# Patient Record
Sex: MALE | Race: OTHER | URBAN - METROPOLITAN AREA
[De-identification: names, ages, dates, MRNs, and addresses within clinical notes are randomized per-mention and may not be internally consistent; named-entity substitution may affect disease eponyms.]

---

## 2020-11-24 ENCOUNTER — OFFICE VISIT (OUTPATIENT)
Dept: FAMILY MEDICINE CLINIC | Facility: CLINIC | Age: 35
End: 2020-11-24
Payer: COMMERCIAL

## 2020-11-24 VITALS
BODY MASS INDEX: 26.71 KG/M2 | TEMPERATURE: 98 F | DIASTOLIC BLOOD PRESSURE: 80 MMHG | HEIGHT: 66.5 IN | SYSTOLIC BLOOD PRESSURE: 132 MMHG | RESPIRATION RATE: 18 BRPM | HEART RATE: 69 BPM | WEIGHT: 168.19 LBS | OXYGEN SATURATION: 100 %

## 2020-11-24 DIAGNOSIS — R73.9 HYPERGLYCEMIA: Primary | ICD-10-CM

## 2020-11-24 DIAGNOSIS — Z13.6 SCREENING, ISCHEMIC HEART DISEASE: ICD-10-CM

## 2020-11-24 DIAGNOSIS — Z83.3 FAMILY HISTORY OF DIABETES MELLITUS: ICD-10-CM

## 2020-11-24 DIAGNOSIS — Z13.0 SCREENING, ANEMIA, DEFICIENCY, IRON: ICD-10-CM

## 2020-11-24 PROCEDURE — 3008F BODY MASS INDEX DOCD: CPT | Performed by: FAMILY MEDICINE

## 2020-11-24 PROCEDURE — 82962 GLUCOSE BLOOD TEST: CPT | Performed by: FAMILY MEDICINE

## 2020-11-24 PROCEDURE — 3075F SYST BP GE 130 - 139MM HG: CPT | Performed by: FAMILY MEDICINE

## 2020-11-24 PROCEDURE — 81003 URINALYSIS AUTO W/O SCOPE: CPT | Performed by: FAMILY MEDICINE

## 2020-11-24 PROCEDURE — 3079F DIAST BP 80-89 MM HG: CPT | Performed by: FAMILY MEDICINE

## 2020-11-24 PROCEDURE — 99203 OFFICE O/P NEW LOW 30 MIN: CPT | Performed by: FAMILY MEDICINE

## 2020-11-24 RX ORDER — METFORMIN HYDROCHLORIDE 500 MG/1
500 TABLET, EXTENDED RELEASE ORAL
Qty: 90 TABLET | Refills: 0 | Status: SHIPPED | OUTPATIENT
Start: 2020-11-24 | End: 2020-12-10

## 2020-11-24 NOTE — PROGRESS NOTES
CHIEF COMPLAINT: Patient presents with:  Establish Care: Concerned with elevated fasting blood sugars     HPI:     Rafael Witt is a 30year old male presents for concern of diabetes.   Both his parents and grandparents have type 2 diabetes diagnose daily with breakfast. 90 tablet 0     Family History   Problem Relation Age of Onset   • Diabetes Father    • Diabetes Mother    • Asthma Mother    • No Known Problems Son    • No Known Problems Maternal Grandmother    • Diabetes Maternal Grandfather    • 11/24/2020 500*   • Bilirubin 11/24/2020 negative    • Ketones, UA 11/24/2020 negative    • Spec Gravity 11/24/2020 1.025    • Blood Urine 11/24/2020 trace-intact*   • PH Urine 11/24/2020 6.0    • Protein Urine 11/24/2020 negative    • Urobilinogen Urine 1 with breakfast.       Health Maintenance:  Annual Physical due on 05/25/1989  Annual Depression Screen due on 05/25/1998  Influenza Vaccine Completed  Pneumococcal Vaccine: Birth to 64yrs Completed      Patient/Caregiver Education: Patient/Caregiver Evangelina Ho

## 2020-11-24 NOTE — PATIENT INSTRUCTIONS
As of October 6th 2014, the Drug Enforcement Agency [facility]) is reclassifying all hydrocodone combination medications from Schedule III to Schedule II. This includes medications such as Norco, Vicodin, Lortab, Zohydro, and Vicoprofen.    What this means for yo

## 2020-12-06 NOTE — PROGRESS NOTES
Results reviewed. Released to [address] Will discuss with patient at next visit on 12/8/2020.

## 2020-12-10 ENCOUNTER — OFFICE VISIT (OUTPATIENT)
Dept: FAMILY MEDICINE CLINIC | Facility: CLINIC | Age: 35
End: 2020-12-10
Payer: COMMERCIAL

## 2020-12-10 VITALS
HEIGHT: 66 IN | BODY MASS INDEX: 26.97 KG/M2 | TEMPERATURE: 98 F | OXYGEN SATURATION: 98 % | HEART RATE: 81 BPM | DIASTOLIC BLOOD PRESSURE: 72 MMHG | RESPIRATION RATE: 18 BRPM | WEIGHT: 167.81 LBS | SYSTOLIC BLOOD PRESSURE: 114 MMHG

## 2020-12-10 DIAGNOSIS — Z00.00 ANNUAL PHYSICAL EXAM: Primary | ICD-10-CM

## 2020-12-10 DIAGNOSIS — E78.2 MIXED HYPERLIPIDEMIA: ICD-10-CM

## 2020-12-10 DIAGNOSIS — Z13.228 SCREENING FOR ENDOCRINE, NUTRITIONAL, METABOLIC AND IMMUNITY DISORDER: ICD-10-CM

## 2020-12-10 DIAGNOSIS — Z13.21 SCREENING FOR ENDOCRINE, NUTRITIONAL, METABOLIC AND IMMUNITY DISORDER: ICD-10-CM

## 2020-12-10 DIAGNOSIS — Z23 NEED FOR VACCINATION: ICD-10-CM

## 2020-12-10 DIAGNOSIS — Z13.0 SCREENING FOR ENDOCRINE, NUTRITIONAL, METABOLIC AND IMMUNITY DISORDER: ICD-10-CM

## 2020-12-10 DIAGNOSIS — R74.01 ELEVATED ALT MEASUREMENT: ICD-10-CM

## 2020-12-10 DIAGNOSIS — E11.9 NEW ONSET TYPE 2 DIABETES MELLITUS (HCC): ICD-10-CM

## 2020-12-10 DIAGNOSIS — Z13.29 SCREENING FOR ENDOCRINE, NUTRITIONAL, METABOLIC AND IMMUNITY DISORDER: ICD-10-CM

## 2020-12-10 PROBLEM — R73.9 HYPERGLYCEMIA: Status: RESOLVED | Noted: 2020-11-24 | Resolved: 2020-12-10

## 2020-12-10 PROCEDURE — 3078F DIAST BP <80 MM HG: CPT | Performed by: FAMILY MEDICINE

## 2020-12-10 PROCEDURE — 90471 IMMUNIZATION ADMIN: CPT | Performed by: FAMILY MEDICINE

## 2020-12-10 PROCEDURE — 3074F SYST BP LT 130 MM HG: CPT | Performed by: FAMILY MEDICINE

## 2020-12-10 PROCEDURE — 3008F BODY MASS INDEX DOCD: CPT | Performed by: FAMILY MEDICINE

## 2020-12-10 PROCEDURE — 99395 PREV VISIT EST AGE 18-39: CPT | Performed by: FAMILY MEDICINE

## 2020-12-10 PROCEDURE — 90715 TDAP VACCINE 7 YRS/> IM: CPT | Performed by: FAMILY MEDICINE

## 2020-12-10 RX ORDER — ATORVASTATIN CALCIUM 40 MG/1
40 TABLET, FILM COATED ORAL NIGHTLY
Qty: 90 TABLET | Refills: 0 | Status: SHIPPED | OUTPATIENT
Start: 2020-12-10 | End: 2021-03-04

## 2020-12-10 RX ORDER — METFORMIN HYDROCHLORIDE 500 MG/1
1000 TABLET, EXTENDED RELEASE ORAL
Qty: 90 TABLET | Refills: 0 | Status: SHIPPED | OUTPATIENT
Start: 2020-12-10 | End: 2021-01-22

## 2020-12-10 NOTE — PATIENT INSTRUCTIONS
As of October 6th 2014, the Drug Enforcement Agency [facility]) is reclassifying all hydrocodone combination medications from Schedule III to Schedule II. This includes medications such as Norco, Vicodin, Lortab, Zohydro, and Vicoprofen.    What this means for yo food and sugary drinks and sodas. Diet should include lean meats and vegetables including 5-7 servings of fruit and vegetables total in 1 day.   Never skip breakfast.  -Encouraged exercise 30 minutes or more 5 times weekly to prevent obesity and chronic di

## 2020-12-10 NOTE — PROGRESS NOTES
Patient presents with:  Physical: discuss recent labs       REASON FOR VISIT:    Wai Landaverde is a 30year old male who presents for an [address]. New onset diabetes-taking Metformin  mg daily-fasting blood sugars in the a. m No results found for: GLUCOSE  Lab Results   Component Value Date    CHOLEST 196 12/03/2020     Lab Results   Component Value Date    HDL 36 (L) 12/03/2020     No results found for: TRIGLY  Lab Results   Component Value Date     (H) 12/03/2020     L Cholesterol Screening Recommended screening varies with age, risk and gender LDL-CHOLESTEROL (mg/dL (calc))   Date Value   12/03/2020 128 (H)       Diabetes Screening  If history of high blood pressure or other  risk factors HEMOGLOBIN A1c (% of total Hgb) • metFORMIN HCl  MG Oral Tablet 24 Hr Take 1 tablet (500 mg total) by mouth daily with breakfast. 90 tablet 0      MEDICAL INFORMATION:   Past Medical History:   Diagnosis Date   • [facility]       History reviewed. No pertinent surgical history. LUNGS: clear to auscultation  CARDIO: RRR without murmur  GI: good BS's, no masses, HSM or tenderness  : Declined  RECTAL: Declined  MUSCULOSKELETAL: back is not tender, FROM of the back  EXTREMITIES: no cyanosis, clubbing or edema  Bilateral barefoot sk -Increase metFORMIN HCl  MG Oral Tablet 24 Hr; Take 2 tablets (1,000 mg total) by mouth daily with breakfast.  Dispense: 90 tablet;  Refill: 0  - COMP METABOLIC PANEL (14)  - HEMOGLOBIN A1C  Goal fasting blood sugar less than 130 and postprandial less • FLUZONE 6 months and older PFS 0.5 ml (24000) 11/20/2020       Influenza Annually   Pneumococcal if high risk   Td/Tdap once then every 10 years   HPV Males 11-21   Zoster (Shingles) 60 and older: one dose   Varicella 2 doses if not immune   MMR 1-2 dose

## 2020-12-11 ENCOUNTER — TELEPHONE (OUTPATIENT)
Dept: ENDOCRINOLOGY CLINIC | Facility: CLINIC | Age: 35
End: 2020-12-11

## 2020-12-11 NOTE — TELEPHONE ENCOUNTER
Diabetes Education: pt will call insurance first before scheduling and he will call back to sched first video visit which we had tentatively scheduled for Tues 12/22 @ 11:00. Pt does not want to schedule until he calls back.

## 2021-01-22 DIAGNOSIS — E11.9 NEW ONSET TYPE 2 DIABETES MELLITUS (HCC): ICD-10-CM

## 2021-01-22 RX ORDER — METFORMIN HYDROCHLORIDE 500 MG/1
1000 TABLET, EXTENDED RELEASE ORAL
Qty: 60 TABLET | Refills: 0 | Status: SHIPPED | OUTPATIENT
Start: 2021-01-22 | End: 2021-02-05

## 2021-01-22 NOTE — TELEPHONE ENCOUNTER
Pt requesting refill of   Requested Prescriptions     Pending Prescriptions Disp Refills   • metFORMIN HCl  MG Oral Tablet 24 Hr 90 tablet 0     Sig: Take 2 tablets (1,000 mg total) by mouth daily with breakfast.       Passed protocol.   Per LOV not

## 2021-01-26 DIAGNOSIS — E11.9 NEW ONSET TYPE 2 DIABETES MELLITUS (HCC): ICD-10-CM

## 2021-01-26 RX ORDER — METFORMIN HYDROCHLORIDE 500 MG/1
TABLET, EXTENDED RELEASE ORAL
Qty: 60 TABLET | Refills: 1 | OUTPATIENT
Start: 2021-01-26

## 2021-01-26 NOTE — TELEPHONE ENCOUNTER
Pt requesting refill of   Requested Prescriptions     Pending Prescriptions Disp Refills   • METFORMIN HCL  MG Oral Tablet 24 Hr [Pharmacy Med Name: METFORMIN HCL  MG TABLET] 60 tablet 1     Sig: TAKE 2 TABLETS BY MOUTH EVERY DAY WITH BREAKFA

## 2021-02-05 ENCOUNTER — OFFICE VISIT (OUTPATIENT)
Dept: FAMILY MEDICINE CLINIC | Facility: CLINIC | Age: 36
End: 2021-02-05
Payer: COMMERCIAL

## 2021-02-05 VITALS
TEMPERATURE: 97 F | WEIGHT: 165.81 LBS | SYSTOLIC BLOOD PRESSURE: 128 MMHG | BODY MASS INDEX: 26.65 KG/M2 | DIASTOLIC BLOOD PRESSURE: 70 MMHG | OXYGEN SATURATION: 100 % | RESPIRATION RATE: 18 BRPM | HEART RATE: 58 BPM | HEIGHT: 66 IN

## 2021-02-05 DIAGNOSIS — E78.2 MIXED HYPERLIPIDEMIA: ICD-10-CM

## 2021-02-05 DIAGNOSIS — Z23 NEED FOR VACCINATION: ICD-10-CM

## 2021-02-05 DIAGNOSIS — E11.9 TYPE 2 DIABETES MELLITUS WITHOUT COMPLICATION, WITHOUT LONG-TERM CURRENT USE OF INSULIN (HCC): Primary | ICD-10-CM

## 2021-02-05 PROCEDURE — 90632 HEPA VACCINE ADULT IM: CPT | Performed by: FAMILY MEDICINE

## 2021-02-05 PROCEDURE — 3008F BODY MASS INDEX DOCD: CPT | Performed by: FAMILY MEDICINE

## 2021-02-05 PROCEDURE — 99214 OFFICE O/P EST MOD 30 MIN: CPT | Performed by: FAMILY MEDICINE

## 2021-02-05 PROCEDURE — 3078F DIAST BP <80 MM HG: CPT | Performed by: FAMILY MEDICINE

## 2021-02-05 PROCEDURE — 3074F SYST BP LT 130 MM HG: CPT | Performed by: FAMILY MEDICINE

## 2021-02-05 PROCEDURE — 90472 IMMUNIZATION ADMIN EACH ADD: CPT | Performed by: FAMILY MEDICINE

## 2021-02-05 PROCEDURE — 90471 IMMUNIZATION ADMIN: CPT | Performed by: FAMILY MEDICINE

## 2021-02-05 PROCEDURE — 90746 HEPB VACCINE 3 DOSE ADULT IM: CPT | Performed by: FAMILY MEDICINE

## 2021-02-05 RX ORDER — BLOOD-GLUCOSE METER
1 EACH MISCELLANEOUS 2 TIMES DAILY
COMMUNITY
Start: 2020-12-10

## 2021-02-05 RX ORDER — BLOOD SUGAR DIAGNOSTIC
STRIP MISCELLANEOUS 2 TIMES DAILY
COMMUNITY
Start: 2020-12-10 | End: 2021-05-11

## 2021-02-05 RX ORDER — LANCETS 33 GAUGE
1 EACH MISCELLANEOUS 2 TIMES DAILY
COMMUNITY
Start: 2020-12-10 | End: 2022-01-04

## 2021-02-05 RX ORDER — METFORMIN HYDROCHLORIDE 500 MG/1
1000 TABLET, EXTENDED RELEASE ORAL 2 TIMES DAILY WITH MEALS
Qty: 360 TABLET | Refills: 0 | Status: SHIPPED | OUTPATIENT
Start: 2021-02-05 | End: 2021-05-04

## 2021-02-05 NOTE — PROGRESS NOTES
Patient presents with:  Diabetes    DM, HL, HTN f/u. HPI:   Kendall Tarango is a 30year old male who presents for recheck of his diabetes, HTN, lipids. Patient’s FBS have been 110-119. Post prandial 190. no hypoglycemic episodes.  Tolerating Metfor apply Misc 1 lancet by Finger stick route 2 (two) times daily. Past Medical History:   Diagnosis Date   • Diabetes ([facility]       History reviewed. No pertinent surgical history. Social History: Smoking:  Denies  Exercise: 6-7 times per week.   Diet: lancet by Finger stick route 2 (two) times daily.  - ONETOUCH ULTRA In Vitro Strip; 2 (two) times daily.  - Lancets (ONETOUCH DELICA PLUS [id number]) Does not apply Misc; 1 lancet by Finger stick route 2 (two) times daily.  - HEPATITIS B VACCINE, OVER 20  - Referrals:  None     2/5/2021  Chay Qureshi DO      Recommendations are: continue medications as advised, check HgbA1C, check fasting lipids and CMP in 3 months, diabetic diet and exercise 7 times per week -30 minutes walking daily, check feet daily nina

## 2021-02-14 DIAGNOSIS — E11.9 TYPE 2 DIABETES MELLITUS WITHOUT COMPLICATION, WITHOUT LONG-TERM CURRENT USE OF INSULIN (HCC): ICD-10-CM

## 2021-02-15 RX ORDER — METFORMIN HYDROCHLORIDE 500 MG/1
TABLET, EXTENDED RELEASE ORAL
Qty: 90 TABLET | Refills: 0 | OUTPATIENT
Start: 2021-02-15

## 2021-02-15 NOTE — TELEPHONE ENCOUNTER
Pt requesting refill of   Requested Prescriptions     Pending Prescriptions Disp Refills   • METFORMIN HCL  MG Oral Tablet 24 Hr [Pharmacy Med Name: METFORMIN HCL  MG TABLET] 90 tablet 0     Sig: TAKE 1 TABLET BY MOUTH EVERY DAY WITH BREAKFAS

## 2021-02-17 DIAGNOSIS — E11.9 TYPE 2 DIABETES MELLITUS WITHOUT COMPLICATION, WITHOUT LONG-TERM CURRENT USE OF INSULIN (HCC): ICD-10-CM

## 2021-02-18 RX ORDER — METFORMIN HYDROCHLORIDE 500 MG/1
TABLET, EXTENDED RELEASE ORAL
Qty: 60 TABLET | Refills: 0 | OUTPATIENT
Start: 2021-02-18

## 2021-02-18 NOTE — TELEPHONE ENCOUNTER
Requested Prescriptions     Pending Prescriptions Disp Refills   • METFORMIN HCL  MG Oral Tablet 24 Hr [Pharmacy Med Name: METFORMIN HCL  MG TABLET] 60 tablet 0     Sig: TAKE 2 TABLETS BY MOUTH EVERY DAY WITH BREAKFAST     Duplicate request. Ne

## 2021-03-04 DIAGNOSIS — E78.2 MIXED HYPERLIPIDEMIA: ICD-10-CM

## 2021-03-04 RX ORDER — ATORVASTATIN CALCIUM 40 MG/1
TABLET, FILM COATED ORAL
Qty: 90 TABLET | Refills: 0 | Status: SHIPPED | OUTPATIENT
Start: 2021-03-04 | End: 2021-05-06

## 2021-03-04 NOTE — TELEPHONE ENCOUNTER
Pt requesting refill of   Requested Prescriptions     Pending Prescriptions Disp Refills   • ATORVASTATIN 40 MG Oral Tab [Pharmacy Med Name: ATORVASTATIN 40 MG TABLET] 90 tablet 0     Sig: TAKE 1 TABLET BY MOUTH EVERY DAY AT NIGHT     Passed protocol, re

## 2021-05-01 DIAGNOSIS — E11.9 TYPE 2 DIABETES MELLITUS WITHOUT COMPLICATION, WITHOUT LONG-TERM CURRENT USE OF INSULIN (HCC): ICD-10-CM

## 2021-05-03 NOTE — TELEPHONE ENCOUNTER
Pt requesting refill of   Requested Prescriptions     Pending Prescriptions Disp Refills   • METFORMIN HCL  MG Oral Tablet 24 Hr [Pharmacy Med Name: METFORMIN HCL  MG TABLET] 360 tablet 0     Sig: TAKE 2 TABLETS BY MOUTH TWICE A DAY WITH MEALS

## 2021-05-04 RX ORDER — METFORMIN HYDROCHLORIDE 500 MG/1
TABLET, EXTENDED RELEASE ORAL
Qty: 360 TABLET | Refills: 0 | Status: SHIPPED | OUTPATIENT
Start: 2021-05-04 | End: 2021-05-06

## 2021-05-06 ENCOUNTER — OFFICE VISIT (OUTPATIENT)
Dept: FAMILY MEDICINE CLINIC | Facility: CLINIC | Age: 36
End: 2021-05-06
Payer: COMMERCIAL

## 2021-05-06 VITALS
WEIGHT: 166.81 LBS | SYSTOLIC BLOOD PRESSURE: 120 MMHG | HEART RATE: 70 BPM | RESPIRATION RATE: 18 BRPM | OXYGEN SATURATION: 100 % | BODY MASS INDEX: 26.81 KG/M2 | TEMPERATURE: 98 F | HEIGHT: 66 IN | DIASTOLIC BLOOD PRESSURE: 80 MMHG

## 2021-05-06 DIAGNOSIS — E78.2 MIXED HYPERLIPIDEMIA: ICD-10-CM

## 2021-05-06 DIAGNOSIS — Z23 NEED FOR VACCINATION: ICD-10-CM

## 2021-05-06 DIAGNOSIS — E11.9 TYPE 2 DIABETES MELLITUS WITHOUT COMPLICATION, WITHOUT LONG-TERM CURRENT USE OF INSULIN (HCC): Primary | ICD-10-CM

## 2021-05-06 PROCEDURE — 3008F BODY MASS INDEX DOCD: CPT | Performed by: FAMILY MEDICINE

## 2021-05-06 PROCEDURE — 90472 IMMUNIZATION ADMIN EACH ADD: CPT | Performed by: FAMILY MEDICINE

## 2021-05-06 PROCEDURE — 90732 PPSV23 VACC 2 YRS+ SUBQ/IM: CPT | Performed by: FAMILY MEDICINE

## 2021-05-06 PROCEDURE — 99214 OFFICE O/P EST MOD 30 MIN: CPT | Performed by: FAMILY MEDICINE

## 2021-05-06 PROCEDURE — 90471 IMMUNIZATION ADMIN: CPT | Performed by: FAMILY MEDICINE

## 2021-05-06 PROCEDURE — 3074F SYST BP LT 130 MM HG: CPT | Performed by: FAMILY MEDICINE

## 2021-05-06 PROCEDURE — 90746 HEPB VACCINE 3 DOSE ADULT IM: CPT | Performed by: FAMILY MEDICINE

## 2021-05-06 PROCEDURE — 3079F DIAST BP 80-89 MM HG: CPT | Performed by: FAMILY MEDICINE

## 2021-05-06 RX ORDER — ATORVASTATIN CALCIUM 20 MG/1
20 TABLET, FILM COATED ORAL NIGHTLY
Qty: 90 TABLET | Refills: 0 | Status: SHIPPED | OUTPATIENT
Start: 2021-05-06 | End: 2021-05-11

## 2021-05-06 RX ORDER — METFORMIN HYDROCHLORIDE 500 MG/1
1000 TABLET, EXTENDED RELEASE ORAL 2 TIMES DAILY WITH MEALS
Qty: 360 TABLET | Refills: 0 | Status: SHIPPED | OUTPATIENT
Start: 2021-05-06 | End: 2021-05-11

## 2021-05-06 NOTE — PROGRESS NOTES
Patient presents with:  Diabetes    DM, HL, HTN f/u. HPI:   Bear Kaye is a 30year old male who presents for recheck of his diabetes, HTN, lipids. Patient’s FBS have been 112-119. Post prandial 190. no hypoglycemic episodes.  Tolerating Metfor w/Device Does not apply Kit 1 lancet by Finger stick route 2 (two) times daily. (Patient not taking: Reported on 5/6/2021 )     • ONETOUCH ULTRA In Vitro Strip 2 (two) times daily.  (Patient not taking: Reported on 5/6/2021 )     • Lancets [facility] ([facility]  - HEPATITIS B VACCINE, OVER 20  - metFORMIN HCl  MG Oral Tablet 24 Hr; Take 2 tablets (1,000 mg total) by mouth 2 (two) times daily with meals. Dispense: 360 tablet;  Refill: 0  - PNEUMOCOCCAL IMM (PNEUMOVAX)  - COMP METABOLIC PANEL (14)  - LI daily with meals. • empagliflozin (JARDIANCE) 10 MG Oral Tab 90 tablet 0     Sig: Take 1 tablet (10 mg total) by mouth daily.        Health Maintenance:  Diabetes Care Dilated Eye Exam due on 05/25/1986  Pneumococcal Vaccine: Birth to 64yrs(1 of 1 - PPSV2

## 2021-05-10 ENCOUNTER — OFFICE VISIT (OUTPATIENT)
Dept: OTHER | Facility: HOSPITAL | Age: 36
End: 2021-05-10
Attending: PREVENTIVE MEDICINE
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
## 2021-05-10 PROCEDURE — 86803 HEPATITIS C AB TEST: CPT

## 2021-05-10 PROCEDURE — 87340 HEPATITIS B SURFACE AG IA: CPT

## 2021-05-10 PROCEDURE — 86701 HIV-1ANTIBODY: CPT

## 2021-05-11 ENCOUNTER — TELEPHONE (OUTPATIENT)
Dept: FAMILY MEDICINE CLINIC | Facility: CLINIC | Age: 36
End: 2021-05-11

## 2021-05-11 DIAGNOSIS — E78.2 MIXED HYPERLIPIDEMIA: ICD-10-CM

## 2021-05-11 DIAGNOSIS — E11.9 TYPE 2 DIABETES MELLITUS WITHOUT COMPLICATION, WITHOUT LONG-TERM CURRENT USE OF INSULIN (HCC): ICD-10-CM

## 2021-05-11 RX ORDER — BLOOD SUGAR DIAGNOSTIC
1 STRIP MISCELLANEOUS 2 TIMES DAILY
Qty: 200 EACH | Refills: 3 | Status: SHIPPED | OUTPATIENT
Start: 2021-05-11 | End: 2021-08-30

## 2021-05-11 RX ORDER — METFORMIN HYDROCHLORIDE 500 MG/1
1000 TABLET, EXTENDED RELEASE ORAL 2 TIMES DAILY WITH MEALS
Qty: 360 TABLET | Refills: 0 | Status: SHIPPED | OUTPATIENT
Start: 2021-05-11 | End: 2021-09-10

## 2021-05-11 RX ORDER — ATORVASTATIN CALCIUM 20 MG/1
20 TABLET, FILM COATED ORAL NIGHTLY
Qty: 90 TABLET | Refills: 0 | Status: SHIPPED | OUTPATIENT
Start: 2021-05-11 | End: 2021-09-10

## 2021-05-11 NOTE — TELEPHONE ENCOUNTER
Pt calling in states he needs his medications from 5/6/21 sent to [facility] instead of [facility]  Atorvastatin, metformin, jardiance, testing strips     Medications sent to [facility]

## 2021-05-29 DIAGNOSIS — E78.2 MIXED HYPERLIPIDEMIA: ICD-10-CM

## 2021-06-01 RX ORDER — ATORVASTATIN CALCIUM 40 MG/1
TABLET, FILM COATED ORAL
Qty: 90 TABLET | Refills: 0 | OUTPATIENT
Start: 2021-06-01

## 2021-06-01 NOTE — TELEPHONE ENCOUNTER
Requested Prescriptions     Pending Prescriptions Disp Refills   • ATORVASTATIN 40 MG Oral Tab [Pharmacy Med Name: ATORVASTATIN 40 MG TABLET] 90 tablet 0     Sig: TAKE 1 TABLET BY MOUTH EVERY DAY AT NIGHT     Atorvastatin 40mg dose denied.    Dose changed t

## 2021-06-05 DIAGNOSIS — E11.9 TYPE 2 DIABETES MELLITUS WITHOUT COMPLICATION, WITHOUT LONG-TERM CURRENT USE OF INSULIN (HCC): ICD-10-CM

## 2021-06-07 RX ORDER — METFORMIN HYDROCHLORIDE 500 MG/1
TABLET, EXTENDED RELEASE ORAL
Qty: 60 TABLET | Refills: 0 | OUTPATIENT
Start: 2021-06-07

## 2021-06-07 NOTE — TELEPHONE ENCOUNTER
Requested Prescriptions     Pending Prescriptions Disp Refills   • METFORMIN HCL  MG Oral Tablet 24 Hr [Pharmacy Med Name: METFORMIN HCL  MG TABLET] 60 tablet 0     Sig: TAKE 2 TABLETS BY MOUTH EVERY DAY WITH BREAKFAST     Refill for metformin

## 2021-07-17 DIAGNOSIS — E11.9 TYPE 2 DIABETES MELLITUS WITHOUT COMPLICATION, WITHOUT LONG-TERM CURRENT USE OF INSULIN (HCC): ICD-10-CM

## 2021-07-17 DIAGNOSIS — E78.2 MIXED HYPERLIPIDEMIA: ICD-10-CM

## 2021-07-19 RX ORDER — BLOOD SUGAR DIAGNOSTIC
1 STRIP MISCELLANEOUS 2 TIMES DAILY
Qty: 200 EACH | Refills: 3 | OUTPATIENT
Start: 2021-07-19

## 2021-07-19 RX ORDER — ATORVASTATIN CALCIUM 20 MG/1
20 TABLET, FILM COATED ORAL NIGHTLY
Qty: 90 TABLET | Refills: 0 | OUTPATIENT
Start: 2021-07-19

## 2021-07-19 NOTE — TELEPHONE ENCOUNTER
Pt requesting refill of   Requested Prescriptions     Pending Prescriptions Disp Refills   • atorvastatin 20 MG Oral Tab 90 tablet 0     Sig: Take 1 tablet (20 mg total) by mouth nightly.    • ONETOUCH ULTRA In Vitro Strip 200 each 3     Si each by [employer]

## 2021-08-07 PROCEDURE — 3051F HG A1C>EQUAL 7.0%<8.0%: CPT | Performed by: FAMILY MEDICINE

## 2021-08-08 LAB
ALBUMIN/GLOBULIN RATIO: 2 (CALC) (ref 1–2.5)
ALBUMIN: 4.7 G/DL (ref 3.6–5.1)
ALKALINE PHOSPHATASE: 77 U/L (ref 36–130)
ALT: 70 U/L (ref 9–46)
AST: 28 U/L (ref 10–40)
BILIRUBIN, TOTAL: 0.9 MG/DL (ref 0.2–1.2)
BUN: 16 MG/DL (ref 7–25)
CALCIUM: 9.7 MG/DL (ref 8.6–10.3)
CARBON DIOXIDE: 29 MMOL/L (ref 20–32)
CHLORIDE: 102 MMOL/L (ref 98–110)
CHOL/HDLC RATIO: 2.9 (CALC)
CHOLESTEROL, TOTAL: 106 MG/DL
CREATININE: 0.97 MG/DL (ref 0.6–1.35)
EGFR IF AFRICN AM: 117 ML/MIN/1.73M2
EGFR IF NONAFRICN AM: 101 ML/MIN/1.73M2
GLOBULIN: 2.4 G/DL (CALC) (ref 1.9–3.7)
GLUCOSE: 142 MG/DL (ref 65–99)
HDL CHOLESTEROL: 37 MG/DL
HEMOGLOBIN A1C: 7.9 % OF TOTAL HGB
LDL-CHOLESTEROL: 47 MG/DL (CALC)
NON-HDL CHOLESTEROL: 69 MG/DL (CALC)
POTASSIUM: 5 MMOL/L (ref 3.5–5.3)
PROTEIN, TOTAL: 7.1 G/DL (ref 6.1–8.1)
SODIUM: 139 MMOL/L (ref 135–146)
TRIGLYCERIDES: 134 MG/DL

## 2021-08-30 DIAGNOSIS — E78.2 MIXED HYPERLIPIDEMIA: ICD-10-CM

## 2021-08-30 DIAGNOSIS — E11.9 TYPE 2 DIABETES MELLITUS WITHOUT COMPLICATION, WITHOUT LONG-TERM CURRENT USE OF INSULIN (HCC): ICD-10-CM

## 2021-08-30 RX ORDER — METFORMIN HYDROCHLORIDE 500 MG/1
1000 TABLET, EXTENDED RELEASE ORAL 2 TIMES DAILY WITH MEALS
Qty: 360 TABLET | Refills: 0 | OUTPATIENT
Start: 2021-08-30

## 2021-08-30 RX ORDER — BLOOD SUGAR DIAGNOSTIC
1 STRIP MISCELLANEOUS 2 TIMES DAILY
Qty: 200 EACH | Refills: 3 | Status: SHIPPED | OUTPATIENT
Start: 2021-08-30 | End: 2022-01-04

## 2021-08-30 NOTE — TELEPHONE ENCOUNTER
Pt requesting refill of   Requested Prescriptions     Pending Prescriptions Disp Refills   • ONETOUCH ULTRA In Vitro Strip 200 each 3     Si each by Other route 2 (two) times daily.      Refused Prescriptions Disp Refills   • metFORMIN HCl  MG O

## 2021-09-10 RX ORDER — METFORMIN HYDROCHLORIDE 500 MG/1
1000 TABLET, EXTENDED RELEASE ORAL 2 TIMES DAILY WITH MEALS
Qty: 360 TABLET | Refills: 0 | Status: SHIPPED | OUTPATIENT
Start: 2021-09-10 | End: 2021-09-13

## 2021-09-10 RX ORDER — ATORVASTATIN CALCIUM 20 MG/1
20 TABLET, FILM COATED ORAL NIGHTLY
Qty: 90 TABLET | Refills: 0 | Status: SHIPPED | OUTPATIENT
Start: 2021-09-10 | End: 2021-09-13

## 2021-09-10 NOTE — TELEPHONE ENCOUNTER
Pt requesting refill of   Requested Prescriptions     Pending Prescriptions Disp Refills   • metFORMIN HCl  MG Oral Tablet 24 Hr 360 tablet 0     Sig: Take 2 tablets (1,000 mg total) by mouth 2 (two) times daily with meals.    • atorvastatin 20 MG O

## 2021-09-13 ENCOUNTER — OFFICE VISIT (OUTPATIENT)
Dept: FAMILY MEDICINE CLINIC | Facility: CLINIC | Age: 36
End: 2021-09-13
Payer: COMMERCIAL

## 2021-09-13 VITALS
HEART RATE: 67 BPM | SYSTOLIC BLOOD PRESSURE: 108 MMHG | WEIGHT: 165.38 LBS | HEIGHT: 66 IN | BODY MASS INDEX: 26.58 KG/M2 | DIASTOLIC BLOOD PRESSURE: 64 MMHG | OXYGEN SATURATION: 99 % | TEMPERATURE: 99 F | RESPIRATION RATE: 16 BRPM

## 2021-09-13 DIAGNOSIS — E78.2 MIXED HYPERLIPIDEMIA: ICD-10-CM

## 2021-09-13 DIAGNOSIS — E11.9 TYPE 2 DIABETES MELLITUS WITHOUT COMPLICATION, WITHOUT LONG-TERM CURRENT USE OF INSULIN (HCC): Primary | ICD-10-CM

## 2021-09-13 PROCEDURE — 99214 OFFICE O/P EST MOD 30 MIN: CPT | Performed by: FAMILY MEDICINE

## 2021-09-13 PROCEDURE — 3078F DIAST BP <80 MM HG: CPT | Performed by: FAMILY MEDICINE

## 2021-09-13 PROCEDURE — 3074F SYST BP LT 130 MM HG: CPT | Performed by: FAMILY MEDICINE

## 2021-09-13 PROCEDURE — 3008F BODY MASS INDEX DOCD: CPT | Performed by: FAMILY MEDICINE

## 2021-09-13 RX ORDER — METFORMIN HYDROCHLORIDE 500 MG/1
1000 TABLET, EXTENDED RELEASE ORAL 2 TIMES DAILY WITH MEALS
Qty: 360 TABLET | Refills: 0 | Status: SHIPPED | OUTPATIENT
Start: 2021-09-13 | End: 2022-01-04

## 2021-09-13 RX ORDER — ATORVASTATIN CALCIUM 20 MG/1
20 TABLET, FILM COATED ORAL NIGHTLY
Qty: 90 TABLET | Refills: 0 | Status: SHIPPED | OUTPATIENT
Start: 2021-09-13 | End: 2022-01-04

## 2021-09-13 NOTE — PROGRESS NOTES
Patient presents with: Follow - Up: 3 months DM     DM, HL, HTN f/u. HPI:   Maine Gavin is a 29year old male who presents for recheck of his diabetes, HTN, lipids. Patient’s FBS have been 88-97 x 1   month. Post prandial 140 x 1 month.  Previo 04/24/2021 39   12/03/2020 128 (H)     AST (U/L)   Date Value   08/07/2021 28   04/24/2021 25   12/03/2020 23     ALT (U/L)   Date Value   08/07/2021 70 (H)   04/24/2021 64 (H)   12/03/2020 55 (H)        Current Outpatient Medications   Medication Vinicio Houston soft, NT/ND no HSM and NABS. Extremities: symmetric no abnormalities and normal strength. No cyanosis, clubbing or edema. Vascular: TP and DP 2+ zane. Skin: is unremarkable with no rashes.     Neuro: no focal abnormalities, and reflexes coordination an sample  Epvmqpkydbzd-ezlzpdurowhj-fmnowhpld A1c  Patient's fasting blood sugars and 2-hour postprandials controlled in the past month  Repeat labs in 3 months.   If A1c is not controlled add Farxiga-peers in network  Goal of fasting blood sugar is  an treatments as a result of today.        Imaging & Referrals:  None     2/5/2021  Salvador Haile DO      Recommendations are: continue medications as advised, check HgbA1C, check fasting lipids and CMP in 3 months, diabetic diet and exercise 7 times per week

## 2022-01-04 ENCOUNTER — OFFICE VISIT (OUTPATIENT)
Dept: FAMILY MEDICINE CLINIC | Facility: CLINIC | Age: 37
End: 2022-01-04
Payer: COMMERCIAL

## 2022-01-04 VITALS
RESPIRATION RATE: 16 BRPM | OXYGEN SATURATION: 99 % | HEIGHT: 66 IN | WEIGHT: 160 LBS | BODY MASS INDEX: 25.71 KG/M2 | SYSTOLIC BLOOD PRESSURE: 118 MMHG | TEMPERATURE: 97 F | DIASTOLIC BLOOD PRESSURE: 72 MMHG | HEART RATE: 68 BPM

## 2022-01-04 DIAGNOSIS — R74.01 ELEVATED ALT MEASUREMENT: ICD-10-CM

## 2022-01-04 DIAGNOSIS — E78.2 MIXED HYPERLIPIDEMIA: ICD-10-CM

## 2022-01-04 DIAGNOSIS — Z23 NEED FOR VACCINATION: ICD-10-CM

## 2022-01-04 DIAGNOSIS — M76.891 TENDONITIS OF KNEE, RIGHT: ICD-10-CM

## 2022-01-04 DIAGNOSIS — D18.01 HEMANGIOMA OF SKIN: ICD-10-CM

## 2022-01-04 DIAGNOSIS — E11.9 TYPE 2 DIABETES MELLITUS WITHOUT COMPLICATION, WITHOUT LONG-TERM CURRENT USE OF INSULIN (HCC): Primary | ICD-10-CM

## 2022-01-04 LAB
CREAT UR-SCNC: 136 MG/DL
MICROALBUMIN UR-MCNC: 0.87 MG/DL
MICROALBUMIN/CREAT 24H UR-RTO: 6.4 UG/MG (ref ?–30)

## 2022-01-04 PROCEDURE — 99214 OFFICE O/P EST MOD 30 MIN: CPT | Performed by: FAMILY MEDICINE

## 2022-01-04 PROCEDURE — 3078F DIAST BP <80 MM HG: CPT | Performed by: FAMILY MEDICINE

## 2022-01-04 PROCEDURE — 3074F SYST BP LT 130 MM HG: CPT | Performed by: FAMILY MEDICINE

## 2022-01-04 PROCEDURE — 3008F BODY MASS INDEX DOCD: CPT | Performed by: FAMILY MEDICINE

## 2022-01-04 PROCEDURE — 90471 IMMUNIZATION ADMIN: CPT | Performed by: FAMILY MEDICINE

## 2022-01-04 PROCEDURE — 82043 UR ALBUMIN QUANTITATIVE: CPT | Performed by: FAMILY MEDICINE

## 2022-01-04 PROCEDURE — 90746 HEPB VACCINE 3 DOSE ADULT IM: CPT | Performed by: FAMILY MEDICINE

## 2022-01-04 PROCEDURE — 82570 ASSAY OF URINE CREATININE: CPT | Performed by: FAMILY MEDICINE

## 2022-01-04 RX ORDER — BLOOD-GLUCOSE METER
1 EACH MISCELLANEOUS 2 TIMES DAILY
Qty: 1 KIT | Refills: 0 | Status: SHIPPED | OUTPATIENT
Start: 2022-01-04 | End: 2023-01-04

## 2022-01-04 RX ORDER — NAPROXEN 500 MG/1
500 TABLET ORAL 2 TIMES DAILY WITH MEALS
Qty: 20 TABLET | Refills: 0 | Status: SHIPPED | OUTPATIENT
Start: 2022-01-04

## 2022-01-04 RX ORDER — BLOOD SUGAR DIAGNOSTIC
1 STRIP MISCELLANEOUS 2 TIMES DAILY
Qty: 200 EACH | Refills: 3 | Status: SHIPPED | OUTPATIENT
Start: 2022-01-04

## 2022-01-04 RX ORDER — METFORMIN HYDROCHLORIDE 500 MG/1
1000 TABLET, EXTENDED RELEASE ORAL 2 TIMES DAILY WITH MEALS
Qty: 360 TABLET | Refills: 0 | Status: SHIPPED | OUTPATIENT
Start: 2022-01-04

## 2022-01-04 RX ORDER — ATORVASTATIN CALCIUM 20 MG/1
20 TABLET, FILM COATED ORAL NIGHTLY
Qty: 90 TABLET | Refills: 0 | Status: SHIPPED | OUTPATIENT
Start: 2022-01-04

## 2022-01-04 RX ORDER — LANCETS 33 GAUGE
1 EACH MISCELLANEOUS 2 TIMES DAILY
Qty: 200 EACH | Refills: 3 | Status: SHIPPED | OUTPATIENT
Start: 2022-01-04

## 2022-01-04 NOTE — PROGRESS NOTES
Patient presents with: Follow - Up: 4 months DM, discuss lab results     DM, HL, HTN f/u. HPI:   Jonathan Brooks is a 29year old male who presents for recheck of his diabetes, HTN, lipids. Patient’s FBS have been 88-97 x 1   month.  Post prandial 6.4 oz (75 kg)  05/06/21 : 166 lb 12.8 oz (75.7 kg)  02/05/21 : 165 lb 12.8 oz (75.2 kg)  12/10/20 : 167 lb 12.8 oz (76.1 kg)  11/24/20 : 168 lb 3.2 oz (76.3 kg)    HEMOGLOBIN A1c (% of total Hgb)   Date Value   12/11/2021 6.4 (H)   08/07/2021 7.9 (H)   04 agree.  Mani Mcgheematias:   /72 (BP Location: Left arm, Patient Position: Sitting, Cuff Size: adult)   Pulse 68   Temp 97.3 °F (36.3 °C) (Temporal)   Resp 16   Ht 5' 6\" (1.676 m)   Wt 160 lb (72.6 kg)   SpO2 99%   BMI 25.82 kg/m²   Vital Signs: As above.    Gene (calc) 2.0   Total Bilirubin      0.2 - 1.2 mg/dL 0.9   Alkaline Phosphatase      36 - 130 U/L 77   AST      10 - 40 U/L 28   ALT (SGPT)      9 - 46 U/L 70 (H)   Cholesterol, Total      <200 mg/dL 106   HDL Cholesterol      > OR = 40 mg/dL 37 (L)   Triglyc Dispense: 200 each; Refill: 3  - Lancets (ONETOUCH DELICA PLUS [id number]) Does not apply Misc; 1 lancet by Finger stick route 2 (two) times daily. Diagnosis code E 11.9  Dispense: 200 each; Refill: 3  - metFORMIN HCl  MG Oral Tablet 24 Hr;  Take 2 tab instability. If symptoms not resolved in the next 3 weeks, follow-up in office  Avoid jumping jacks which aggravate the pain    6.  Hemangioma of skin  Benign skin lesions-can refer to dermatologist for cosmetic treatment and laser removal which is not cov check HgbA1C, check fasting lipids and CMP in 3 months, diabetic diet and exercise 7 times per week -30 minutes walking daily, check feet daily recommended to the patient. The patient indicates understanding of these issues and agrees to the plan.   No f

## 2022-02-07 RX ORDER — BLOOD-GLUCOSE METER
EACH MISCELLANEOUS
Refills: 0 | OUTPATIENT
Start: 2022-02-07

## 2022-04-17 LAB
ALBUMIN/GLOBULIN RATIO: 1.7 (CALC) (ref 1–2.5)
ALBUMIN: 4.7 G/DL (ref 3.6–5.1)
ALKALINE PHOSPHATASE: 65 U/L (ref 36–130)
ALT: 50 U/L (ref 9–46)
AST: 26 U/L (ref 10–40)
BILIRUBIN, TOTAL: 1.5 MG/DL (ref 0.2–1.2)
BUN: 12 MG/DL (ref 7–25)
CALCIUM: 9.9 MG/DL (ref 8.6–10.3)
CARBON DIOXIDE: 30 MMOL/L (ref 20–32)
CHLORIDE: 104 MMOL/L (ref 98–110)
CHOL/HDLC RATIO: 2.7 (CALC)
CHOLESTEROL, TOTAL: 117 MG/DL
CREATININE: 0.85 MG/DL (ref 0.6–1.35)
EGFR IF AFRICN AM: 131 ML/MIN/1.73M2
EGFR IF NONAFRICN AM: 113 ML/MIN/1.73M2
GLOBULIN: 2.8 G/DL (CALC) (ref 1.9–3.7)
GLUCOSE: 103 MG/DL (ref 65–99)
HDL CHOLESTEROL: 43 MG/DL
HEMOGLOBIN A1C: 6.1 % OF TOTAL HGB
LDL-CHOLESTEROL: 53 MG/DL (CALC)
NON-HDL CHOLESTEROL: 74 MG/DL (CALC)
POTASSIUM: 4.8 MMOL/L (ref 3.5–5.3)
PROTEIN, TOTAL: 7.5 G/DL (ref 6.1–8.1)
SODIUM: 140 MMOL/L (ref 135–146)
TRIGLYCERIDES: 129 MG/DL

## 2022-05-27 ENCOUNTER — OFFICE VISIT (OUTPATIENT)
Dept: FAMILY MEDICINE CLINIC | Facility: CLINIC | Age: 37
End: 2022-05-27
Payer: COMMERCIAL

## 2022-05-27 VITALS
OXYGEN SATURATION: 99 % | BODY MASS INDEX: 26.01 KG/M2 | DIASTOLIC BLOOD PRESSURE: 70 MMHG | SYSTOLIC BLOOD PRESSURE: 118 MMHG | WEIGHT: 158 LBS | RESPIRATION RATE: 14 BRPM | HEIGHT: 65.55 IN | TEMPERATURE: 97 F | HEART RATE: 61 BPM

## 2022-05-27 DIAGNOSIS — Z23 NEED FOR VACCINATION: ICD-10-CM

## 2022-05-27 DIAGNOSIS — R74.01 ELEVATED ALT MEASUREMENT: ICD-10-CM

## 2022-05-27 DIAGNOSIS — Z13.21 SCREENING FOR ENDOCRINE, NUTRITIONAL, METABOLIC AND IMMUNITY DISORDER: ICD-10-CM

## 2022-05-27 DIAGNOSIS — E78.2 MIXED HYPERLIPIDEMIA: ICD-10-CM

## 2022-05-27 DIAGNOSIS — Z13.29 SCREENING FOR ENDOCRINE, NUTRITIONAL, METABOLIC AND IMMUNITY DISORDER: ICD-10-CM

## 2022-05-27 DIAGNOSIS — Z00.00 ANNUAL PHYSICAL EXAM: Primary | ICD-10-CM

## 2022-05-27 DIAGNOSIS — R17 SERUM TOTAL BILIRUBIN ELEVATED: ICD-10-CM

## 2022-05-27 DIAGNOSIS — Z13.0 SCREENING FOR ENDOCRINE, NUTRITIONAL, METABOLIC AND IMMUNITY DISORDER: ICD-10-CM

## 2022-05-27 DIAGNOSIS — E11.9 TYPE 2 DIABETES MELLITUS WITHOUT COMPLICATION, WITHOUT LONG-TERM CURRENT USE OF INSULIN (HCC): ICD-10-CM

## 2022-05-27 DIAGNOSIS — Z13.228 SCREENING FOR ENDOCRINE, NUTRITIONAL, METABOLIC AND IMMUNITY DISORDER: ICD-10-CM

## 2022-05-27 DIAGNOSIS — R05.8 POST-VIRAL COUGH SYNDROME: ICD-10-CM

## 2022-05-27 PROBLEM — M76.891 TENDONITIS OF KNEE, RIGHT: Status: RESOLVED | Noted: 2022-01-04 | Resolved: 2022-05-27

## 2022-05-27 PROBLEM — Z83.3 FAMILY HISTORY OF DIABETES MELLITUS: Status: RESOLVED | Noted: 2020-11-24 | Resolved: 2022-05-27

## 2022-05-27 RX ORDER — ATORVASTATIN CALCIUM 20 MG/1
20 TABLET, FILM COATED ORAL NIGHTLY
Qty: 90 TABLET | Refills: 1 | Status: SHIPPED | OUTPATIENT
Start: 2022-05-27

## 2022-05-27 RX ORDER — FLUTICASONE FUROATE AND VILANTEROL TRIFENATATE 100; 25 UG/1; UG/1
1 POWDER RESPIRATORY (INHALATION) DAILY
Qty: 60 EACH | Refills: 0 | Status: SHIPPED | OUTPATIENT
Start: 2022-05-27

## 2022-05-27 RX ORDER — METFORMIN HYDROCHLORIDE 500 MG/1
1000 TABLET, EXTENDED RELEASE ORAL 2 TIMES DAILY WITH MEALS
Qty: 360 TABLET | Refills: 1 | Status: SHIPPED | OUTPATIENT
Start: 2022-05-27

## 2022-11-12 DIAGNOSIS — E78.2 MIXED HYPERLIPIDEMIA: ICD-10-CM

## 2022-11-12 DIAGNOSIS — E11.9 TYPE 2 DIABETES MELLITUS WITHOUT COMPLICATION, WITHOUT LONG-TERM CURRENT USE OF INSULIN (HCC): ICD-10-CM

## 2022-11-14 RX ORDER — ATORVASTATIN CALCIUM 20 MG/1
TABLET, FILM COATED ORAL
Qty: 90 TABLET | Refills: 1 | Status: SHIPPED | OUTPATIENT
Start: 2022-11-14

## 2022-11-14 RX ORDER — METFORMIN HYDROCHLORIDE 500 MG/1
TABLET, EXTENDED RELEASE ORAL
Qty: 360 TABLET | Refills: 1 | OUTPATIENT
Start: 2022-11-14

## 2022-11-25 PROCEDURE — 3044F HG A1C LEVEL LT 7.0%: CPT | Performed by: FAMILY MEDICINE

## 2022-11-26 LAB
ABSOLUTE BASOPHILS: 19 CELLS/UL (ref 0–200)
ABSOLUTE EOSINOPHILS: 113 CELLS/UL (ref 15–500)
ABSOLUTE LYMPHOCYTES: 3131 CELLS/UL (ref 850–3900)
ABSOLUTE MONOCYTES: 410 CELLS/UL (ref 200–950)
ABSOLUTE NEUTROPHILS: 2627 CELLS/UL (ref 1500–7800)
ALBUMIN/GLOBULIN RATIO: 1.7 (CALC) (ref 1–2.5)
ALBUMIN: 4.5 G/DL (ref 3.6–5.1)
ALKALINE PHOSPHATASE: 72 U/L (ref 36–130)
ALT: 41 U/L (ref 9–46)
AST: 19 U/L (ref 10–40)
BASOPHILS: 0.3 %
BILIRUBIN, DIRECT: 0.1 MG/DL
BILIRUBIN, TOTAL: 0.7 MG/DL (ref 0.2–1.2)
BUN: 16 MG/DL (ref 7–25)
CALCIUM: 9.4 MG/DL (ref 8.6–10.3)
CARBON DIOXIDE: 29 MMOL/L (ref 20–32)
CHLORIDE: 102 MMOL/L (ref 98–110)
CHOL/HDLC RATIO: 2.6 (CALC)
CHOLESTEROL, TOTAL: 98 MG/DL
CREATININE: 1.06 MG/DL (ref 0.6–1.26)
EGFR: 93 ML/MIN/1.73M2
EOSINOPHILS: 1.8 %
GLOBULIN: 2.7 G/DL (CALC) (ref 1.9–3.7)
GLUCOSE: 108 MG/DL (ref 65–99)
HDL CHOLESTEROL: 37 MG/DL
HEMATOCRIT: 45.7 % (ref 38.5–50)
HEMOGLOBIN A1C: 6.5 % OF TOTAL HGB
HEMOGLOBIN: 15.2 G/DL (ref 13.2–17.1)
LDL-CHOLESTEROL: 43 MG/DL (CALC)
LYMPHOCYTES: 49.7 %
MCH: 29.5 PG (ref 27–33)
MCHC: 33.3 G/DL (ref 32–36)
MCV: 88.7 FL (ref 80–100)
MONOCYTES: 6.5 %
MPV: 9.3 FL (ref 7.5–12.5)
NEUTROPHILS: 41.7 %
NON-HDL CHOLESTEROL: 61 MG/DL (CALC)
PLATELET COUNT: 219 THOUSAND/UL (ref 140–400)
POTASSIUM: 4.7 MMOL/L (ref 3.5–5.3)
PROTEIN, TOTAL: 7.2 G/DL (ref 6.1–8.1)
RDW: 13.3 % (ref 11–15)
RED BLOOD CELL COUNT: 5.15 MILLION/UL (ref 4.2–5.8)
SODIUM: 138 MMOL/L (ref 135–146)
TRIGLYCERIDES: 97 MG/DL
TSH W/REFLEX TO FT4: 2.63 MIU/L (ref 0.4–4.5)
WHITE BLOOD CELL COUNT: 6.3 THOUSAND/UL (ref 3.8–10.8)

## 2022-11-28 DIAGNOSIS — E11.9 TYPE 2 DIABETES MELLITUS WITHOUT COMPLICATION, WITHOUT LONG-TERM CURRENT USE OF INSULIN (HCC): ICD-10-CM

## 2022-11-30 RX ORDER — METFORMIN HYDROCHLORIDE 500 MG/1
1000 TABLET, EXTENDED RELEASE ORAL 2 TIMES DAILY WITH MEALS
Qty: 360 TABLET | Refills: 1 | Status: SHIPPED | OUTPATIENT
Start: 2022-11-30

## 2022-12-09 ENCOUNTER — OFFICE VISIT (OUTPATIENT)
Dept: FAMILY MEDICINE CLINIC | Facility: CLINIC | Age: 37
End: 2022-12-09
Payer: COMMERCIAL

## 2022-12-09 VITALS
SYSTOLIC BLOOD PRESSURE: 116 MMHG | TEMPERATURE: 98 F | OXYGEN SATURATION: 99 % | HEART RATE: 65 BPM | RESPIRATION RATE: 18 BRPM | WEIGHT: 162 LBS | BODY MASS INDEX: 27 KG/M2 | DIASTOLIC BLOOD PRESSURE: 70 MMHG

## 2022-12-09 DIAGNOSIS — E78.2 MIXED HYPERLIPIDEMIA: ICD-10-CM

## 2022-12-09 DIAGNOSIS — E11.9 TYPE 2 DIABETES MELLITUS WITHOUT COMPLICATION, WITHOUT LONG-TERM CURRENT USE OF INSULIN (HCC): Primary | ICD-10-CM

## 2022-12-09 DIAGNOSIS — R74.01 ELEVATED ALT MEASUREMENT: ICD-10-CM

## 2022-12-09 PROCEDURE — 99214 OFFICE O/P EST MOD 30 MIN: CPT | Performed by: FAMILY MEDICINE

## 2022-12-09 PROCEDURE — 3074F SYST BP LT 130 MM HG: CPT | Performed by: FAMILY MEDICINE

## 2022-12-09 PROCEDURE — 3078F DIAST BP <80 MM HG: CPT | Performed by: FAMILY MEDICINE

## 2023-02-23 ENCOUNTER — TELEPHONE (OUTPATIENT)
Dept: FAMILY MEDICINE CLINIC | Facility: CLINIC | Age: 38
End: 2023-02-23

## 2023-02-23 DIAGNOSIS — E11.9 TYPE 2 DIABETES MELLITUS WITHOUT COMPLICATION, WITHOUT LONG-TERM CURRENT USE OF INSULIN (HCC): ICD-10-CM

## 2023-02-23 RX ORDER — METFORMIN HYDROCHLORIDE 500 MG/1
TABLET, EXTENDED RELEASE ORAL
Qty: 120 TABLET | Refills: 0 | Status: SHIPPED | OUTPATIENT
Start: 2023-02-23

## 2023-02-24 NOTE — TELEPHONE ENCOUNTER
Patient is requesting refill for metformin but has not had diabetic labs and is behind on urine microalbumin annual screening. Labs were ordered 12/9/2022 it is now 2/23/2023 and last labs were in August 2022. Please have patient's schedule his annual diabetic follow-up. Inform us if he has had completed his eye exam which is also overdue and he needs to schedule an appointment and document when his labs will be done prior to his appointment for any further refills.   Thank you

## 2023-02-24 NOTE — TELEPHONE ENCOUNTER
Pt will complete the ordered labs next week. Eye exam scheduled for 3/4/2023. Follow up with Dr. Mario Cage is scheduled for 3/30/2023. Routed to provider for review.

## 2023-04-15 PROCEDURE — 3061F NEG MICROALBUMINURIA REV: CPT | Performed by: FAMILY MEDICINE

## 2023-04-15 PROCEDURE — 3051F HG A1C>EQUAL 7.0%<8.0%: CPT | Performed by: FAMILY MEDICINE

## 2023-04-16 LAB
ALBUMIN/GLOBULIN RATIO: 1.8 (CALC) (ref 1–2.5)
ALBUMIN: 4.6 G/DL (ref 3.6–5.1)
ALKALINE PHOSPHATASE: 92 U/L (ref 36–130)
ALT: 41 U/L (ref 9–46)
AST: 17 U/L (ref 10–40)
BILIRUBIN, TOTAL: 0.9 MG/DL (ref 0.2–1.2)
BUN: 19 MG/DL (ref 7–25)
CALCIUM: 9.8 MG/DL (ref 8.6–10.3)
CARBON DIOXIDE: 27 MMOL/L (ref 20–32)
CHLORIDE: 103 MMOL/L (ref 98–110)
CHOL/HDLC RATIO: 2.9 (CALC)
CHOLESTEROL, TOTAL: 112 MG/DL
CREATININE, RANDOM URINE: 87 MG/DL (ref 20–320)
CREATININE: 0.96 MG/DL (ref 0.6–1.26)
EGFR: 105 ML/MIN/1.73M2
GLOBULIN: 2.6 G/DL (CALC) (ref 1.9–3.7)
GLUCOSE: 131 MG/DL (ref 65–99)
HDL CHOLESTEROL: 38 MG/DL
HEMOGLOBIN A1C: 7.1 % OF TOTAL HGB
LDL-CHOLESTEROL: 54 MG/DL (CALC)
MICROALBUMIN: <0.2 MG/DL
NON-HDL CHOLESTEROL: 74 MG/DL (CALC)
POTASSIUM: 4.9 MMOL/L (ref 3.5–5.3)
PROTEIN, TOTAL: 7.2 G/DL (ref 6.1–8.1)
SODIUM: 140 MMOL/L (ref 135–146)
TRIGLYCERIDES: 113 MG/DL

## 2023-04-20 ENCOUNTER — OFFICE VISIT (OUTPATIENT)
Dept: FAMILY MEDICINE CLINIC | Facility: CLINIC | Age: 38
End: 2023-04-20
Payer: COMMERCIAL

## 2023-04-20 VITALS
BODY MASS INDEX: 26.89 KG/M2 | WEIGHT: 163.38 LBS | RESPIRATION RATE: 20 BRPM | HEART RATE: 73 BPM | OXYGEN SATURATION: 97 % | TEMPERATURE: 97 F | HEIGHT: 65.43 IN | SYSTOLIC BLOOD PRESSURE: 118 MMHG | DIASTOLIC BLOOD PRESSURE: 70 MMHG

## 2023-04-20 DIAGNOSIS — E11.9 TYPE 2 DIABETES MELLITUS WITHOUT COMPLICATION, WITHOUT LONG-TERM CURRENT USE OF INSULIN (HCC): ICD-10-CM

## 2023-04-20 DIAGNOSIS — S86.911A KNEE STRAIN, RIGHT, INITIAL ENCOUNTER: ICD-10-CM

## 2023-04-20 DIAGNOSIS — R17 SERUM TOTAL BILIRUBIN ELEVATED: ICD-10-CM

## 2023-04-20 DIAGNOSIS — E78.2 MIXED HYPERLIPIDEMIA: ICD-10-CM

## 2023-04-20 DIAGNOSIS — E11.9 TYPE 2 DIABETES MELLITUS WITHOUT COMPLICATION, WITHOUT LONG-TERM CURRENT USE OF INSULIN (HCC): Primary | ICD-10-CM

## 2023-04-20 PROCEDURE — 3074F SYST BP LT 130 MM HG: CPT | Performed by: FAMILY MEDICINE

## 2023-04-20 PROCEDURE — 3078F DIAST BP <80 MM HG: CPT | Performed by: FAMILY MEDICINE

## 2023-04-20 PROCEDURE — 99214 OFFICE O/P EST MOD 30 MIN: CPT | Performed by: FAMILY MEDICINE

## 2023-04-20 PROCEDURE — 3008F BODY MASS INDEX DOCD: CPT | Performed by: FAMILY MEDICINE

## 2023-04-20 RX ORDER — FLASH GLUCOSE SENSOR
KIT MISCELLANEOUS
Qty: 2 EACH | Refills: 11 | Status: SHIPPED | OUTPATIENT
Start: 2023-04-20

## 2023-04-20 RX ORDER — METFORMIN HYDROCHLORIDE 500 MG/1
TABLET, EXTENDED RELEASE ORAL
Qty: 360 TABLET | Refills: 1 | Status: SHIPPED | OUTPATIENT
Start: 2023-04-20

## 2023-04-20 RX ORDER — METFORMIN HYDROCHLORIDE 500 MG/1
1000 TABLET, EXTENDED RELEASE ORAL 2 TIMES DAILY WITH MEALS
Qty: 120 TABLET | Refills: 1 | Status: SHIPPED | OUTPATIENT
Start: 2023-04-20 | End: 2023-04-20

## 2023-04-20 RX ORDER — GLYBURIDE 2.5 MG/1
2.5 TABLET ORAL
Qty: 90 TABLET | Refills: 0 | Status: SHIPPED | OUTPATIENT
Start: 2023-04-20

## 2023-04-24 ENCOUNTER — TELEPHONE (OUTPATIENT)
Dept: FAMILY MEDICINE CLINIC | Facility: CLINIC | Age: 38
End: 2023-04-24

## 2023-04-24 NOTE — TELEPHONE ENCOUNTER
Fax received from [employer] requesting more information regarding pt's history of continuous glucose monitors.      Form filled out at faxed back at [phone]

## 2023-05-12 ENCOUNTER — TELEPHONE (OUTPATIENT)
Dept: FAMILY MEDICINE CLINIC | Facility: CLINIC | Age: 38
End: 2023-05-12

## 2023-05-12 NOTE — TELEPHONE ENCOUNTER
Prescription coverage determination for freestyle letty 14 day sensor miscellaneous has not been approved for benefits.

## 2023-05-23 DIAGNOSIS — E78.2 MIXED HYPERLIPIDEMIA: ICD-10-CM

## 2023-05-23 RX ORDER — ATORVASTATIN CALCIUM 20 MG/1
TABLET, FILM COATED ORAL
Qty: 90 TABLET | Refills: 1 | Status: SHIPPED | OUTPATIENT
Start: 2023-05-23

## 2023-05-31 DIAGNOSIS — E11.9 TYPE 2 DIABETES MELLITUS WITHOUT COMPLICATION, WITHOUT LONG-TERM CURRENT USE OF INSULIN (HCC): ICD-10-CM

## 2023-05-31 RX ORDER — FLASH GLUCOSE SENSOR
KIT MISCELLANEOUS
Qty: 2 EACH | Refills: 11 | Status: SHIPPED | OUTPATIENT
Start: 2023-05-31

## 2023-07-18 DIAGNOSIS — E11.9 TYPE 2 DIABETES MELLITUS WITHOUT COMPLICATION, WITHOUT LONG-TERM CURRENT USE OF INSULIN (HCC): ICD-10-CM

## 2023-07-18 RX ORDER — GLYBURIDE 2.5 MG/1
2.5 TABLET ORAL
Qty: 90 TABLET | Refills: 0 | Status: SHIPPED | OUTPATIENT
Start: 2023-07-18

## 2023-07-18 NOTE — TELEPHONE ENCOUNTER
Refill Passed Protocol:     Pt requesting refill of   Requested Prescriptions     Pending Prescriptions Disp Refills    GLYBURIDE 2.5 MG Oral Tab [Pharmacy Med Name: GLYBURIDE 2.5 MG TABLET] 90 tablet 0     Sig: TAKE 1 TABLET BY MOUTH DAILY WITH BREAKFAST. Refill was approved and sent to pharmacy:   melia 2 diabetes mellitus without complication, without long-term current use of insulin ([address]  - metFORMIN  MG Oral Tablet 24 Hr; Take 2 tablets (1,000 mg total) by mouth 2 (two) times daily with meals. Dispense: 120 tablet; Refill: 1  - glyBURIDE 2.5 MG Oral Tab; Take 1 tablet (2.5 mg total) by mouth daily with breakfast.  Dispense: 90 tablet; Refill: 0  - Continuous Blood Gluc  (FREESTYLE ABNER 2 READER) Does not apply Device; 1 Units daily. Dispense: 1 each; Refill: 0  - Continuous Blood Gluc Sensor (FREESTYLE ABNER 14 DAY SENSOR) Does not apply Misc; Change sensor q 2 weeks. Will pay cash  Dispense: 2 each; Refill: 11  Controlled-A1c less than 7.0  Continue twice daily Accu-Cheks  Goal of fasting blood sugar is  and postprandials are less than 180-optimal is 160 or less  Continue walking 30-60 minutes 5 days a week--not going to the gym due to COVID-19  Continue low-carb diet  Encouraged 5 pound weight loss  Eye exam completed recommended annually-performed 2/2023 with Dr. Alireza Nunez  Importance of daily self foot exam discussed-any sores or lesions needs office visit  Recheck labs in 3 months  Last Time Medication was Filled:  4/20/2023    Last Office Visit with Provider: 4/20/2023    No future appointments.

## 2023-11-10 DIAGNOSIS — E11.9 TYPE 2 DIABETES MELLITUS WITHOUT COMPLICATION, WITHOUT LONG-TERM CURRENT USE OF INSULIN (HCC): ICD-10-CM

## 2023-11-10 RX ORDER — METFORMIN HYDROCHLORIDE 500 MG/1
1000 TABLET, EXTENDED RELEASE ORAL 2 TIMES DAILY WITH MEALS
Qty: 360 TABLET | Refills: 1 | OUTPATIENT
Start: 2023-11-10

## 2023-11-10 NOTE — TELEPHONE ENCOUNTER
Refill Failed Protocol:     Pt requesting refill of   Requested Prescriptions     Pending Prescriptions Disp Refills    metFORMIN  MG Oral Tablet 24 Hr 360 tablet 1     Sig: Take 2 tablets (1,000 mg total) by mouth 2 (two) times daily with meals. Last Time Medication was Filled:  4/20/2023    Last Office Visit with Provider: 4/20/2023    Unable to approve refill,     Diabetic Medication Protocol Dkrhea22/10/2023 09:47 AM   Protocol Details HgBA1C procedure resulted in past 6 months    Last HgBA1C < 7.5    Appointment in past 6 or next 3 months    Microalbumin procedure in past 12 months or taking ACE/ARB            No future appointments.

## 2023-11-16 DIAGNOSIS — E78.2 MIXED HYPERLIPIDEMIA: ICD-10-CM

## 2023-11-16 RX ORDER — ATORVASTATIN CALCIUM 20 MG/1
20 TABLET, FILM COATED ORAL NIGHTLY
Qty: 90 TABLET | Refills: 1 | Status: SHIPPED | OUTPATIENT
Start: 2023-11-16

## 2023-11-21 PROCEDURE — 3044F HG A1C LEVEL LT 7.0%: CPT | Performed by: FAMILY MEDICINE

## 2023-11-22 LAB
ALBUMIN/GLOBULIN RATIO: 1.9 (CALC) (ref 1–2.5)
ALBUMIN: 4.8 G/DL (ref 3.6–5.1)
ALKALINE PHOSPHATASE: 76 U/L (ref 36–130)
ALT: 48 U/L (ref 9–46)
AST: 23 U/L (ref 10–40)
BILIRUBIN, TOTAL: 0.9 MG/DL (ref 0.2–1.2)
BUN: 18 MG/DL (ref 7–25)
CALCIUM: 9.8 MG/DL (ref 8.6–10.3)
CARBON DIOXIDE: 27 MMOL/L (ref 20–32)
CHLORIDE: 104 MMOL/L (ref 98–110)
CHOL/HDLC RATIO: 4.1 (CALC)
CHOLESTEROL, TOTAL: 150 MG/DL
CREATININE: 1 MG/DL (ref 0.6–1.26)
EGFR: 99 ML/MIN/1.73M2
GLOBULIN: 2.5 G/DL (CALC) (ref 1.9–3.7)
GLUCOSE: 118 MG/DL (ref 65–99)
HDL CHOLESTEROL: 37 MG/DL
HEMOGLOBIN A1C: 6.8 % OF TOTAL HGB
LDL-CHOLESTEROL: 80 MG/DL (CALC)
NON-HDL CHOLESTEROL: 113 MG/DL (CALC)
POTASSIUM: 5.1 MMOL/L (ref 3.5–5.3)
PROTEIN, TOTAL: 7.3 G/DL (ref 6.1–8.1)
SODIUM: 142 MMOL/L (ref 135–146)
TRIGLYCERIDES: 252 MG/DL

## 2023-11-27 DIAGNOSIS — E11.9 TYPE 2 DIABETES MELLITUS WITHOUT COMPLICATION, WITHOUT LONG-TERM CURRENT USE OF INSULIN (HCC): ICD-10-CM

## 2023-11-27 RX ORDER — METFORMIN HYDROCHLORIDE 500 MG/1
1000 TABLET, EXTENDED RELEASE ORAL 2 TIMES DAILY WITH MEALS
Qty: 360 TABLET | Refills: 1 | Status: SHIPPED | OUTPATIENT
Start: 2023-11-27

## 2023-11-27 NOTE — TELEPHONE ENCOUNTER
Refill Passed Protocol:     Pt requesting refill of   Requested Prescriptions     Pending Prescriptions Disp Refills    metFORMIN  MG Oral Tablet 24 Hr 360 tablet 1     Sig: Take 2 tablets (1,000 mg total) by mouth 2 (two) times daily with meals.      Refill was approved and sent to pharmacy:     Last Time Medication was Filled:  4/20/2023    Last Office Visit with Provider: 4/20/2023    Appt. scheduled on 12/19/2023

## 2023-12-17 DIAGNOSIS — E11.9 TYPE 2 DIABETES MELLITUS WITHOUT COMPLICATION, WITHOUT LONG-TERM CURRENT USE OF INSULIN (HCC): ICD-10-CM

## 2023-12-18 RX ORDER — GLYBURIDE 2.5 MG/1
2.5 TABLET ORAL
Qty: 90 TABLET | Refills: 0 | Status: SHIPPED | OUTPATIENT
Start: 2023-12-18

## 2023-12-19 ENCOUNTER — OFFICE VISIT (OUTPATIENT)
Dept: FAMILY MEDICINE CLINIC | Facility: CLINIC | Age: 38
End: 2023-12-19
Payer: COMMERCIAL

## 2023-12-19 VITALS
HEART RATE: 90 BPM | BODY MASS INDEX: 27.27 KG/M2 | TEMPERATURE: 97 F | OXYGEN SATURATION: 98 % | WEIGHT: 167.63 LBS | RESPIRATION RATE: 20 BRPM | DIASTOLIC BLOOD PRESSURE: 76 MMHG | SYSTOLIC BLOOD PRESSURE: 110 MMHG | HEIGHT: 65.9 IN

## 2023-12-19 DIAGNOSIS — S83.421A SPRAIN OF LATERAL COLLATERAL LIGAMENT OF RIGHT KNEE, INITIAL ENCOUNTER: ICD-10-CM

## 2023-12-19 DIAGNOSIS — E78.2 MIXED HYPERLIPIDEMIA: ICD-10-CM

## 2023-12-19 DIAGNOSIS — R74.01 ELEVATED ALT MEASUREMENT: ICD-10-CM

## 2023-12-19 DIAGNOSIS — Z23 NEED FOR VACCINATION: ICD-10-CM

## 2023-12-19 DIAGNOSIS — Z13.21 SCREENING FOR ENDOCRINE, NUTRITIONAL, METABOLIC AND IMMUNITY DISORDER: ICD-10-CM

## 2023-12-19 DIAGNOSIS — M25.561 CHRONIC PAIN OF RIGHT KNEE: Chronic | ICD-10-CM

## 2023-12-19 DIAGNOSIS — Z13.228 SCREENING FOR ENDOCRINE, NUTRITIONAL, METABOLIC AND IMMUNITY DISORDER: ICD-10-CM

## 2023-12-19 DIAGNOSIS — Z00.00 ANNUAL PHYSICAL EXAM: Primary | ICD-10-CM

## 2023-12-19 DIAGNOSIS — G89.29 CHRONIC PAIN OF RIGHT KNEE: Chronic | ICD-10-CM

## 2023-12-19 DIAGNOSIS — E11.9 TYPE 2 DIABETES MELLITUS WITHOUT COMPLICATION, WITHOUT LONG-TERM CURRENT USE OF INSULIN (HCC): ICD-10-CM

## 2023-12-19 DIAGNOSIS — Z13.29 SCREENING FOR ENDOCRINE, NUTRITIONAL, METABOLIC AND IMMUNITY DISORDER: ICD-10-CM

## 2023-12-19 DIAGNOSIS — Z13.0 SCREENING FOR ENDOCRINE, NUTRITIONAL, METABOLIC AND IMMUNITY DISORDER: ICD-10-CM

## 2023-12-19 PROBLEM — S86.911A KNEE STRAIN, RIGHT, INITIAL ENCOUNTER: Status: RESOLVED | Noted: 2023-04-20 | Resolved: 2023-12-19

## 2023-12-19 PROBLEM — R17 SERUM TOTAL BILIRUBIN ELEVATED: Status: RESOLVED | Noted: 2022-05-27 | Resolved: 2023-12-19

## 2023-12-19 PROCEDURE — 3008F BODY MASS INDEX DOCD: CPT | Performed by: FAMILY MEDICINE

## 2023-12-19 PROCEDURE — 3078F DIAST BP <80 MM HG: CPT | Performed by: FAMILY MEDICINE

## 2023-12-19 PROCEDURE — 3074F SYST BP LT 130 MM HG: CPT | Performed by: FAMILY MEDICINE

## 2023-12-19 PROCEDURE — 99395 PREV VISIT EST AGE 18-39: CPT | Performed by: FAMILY MEDICINE

## 2023-12-19 RX ORDER — ATORVASTATIN CALCIUM 20 MG/1
40 TABLET, FILM COATED ORAL NIGHTLY
Qty: 90 TABLET | Refills: 0 | Status: SHIPPED | OUTPATIENT
Start: 2023-12-19 | End: 2023-12-19

## 2023-12-19 RX ORDER — ATORVASTATIN CALCIUM 40 MG/1
40 TABLET, FILM COATED ORAL NIGHTLY
Qty: 90 TABLET | Refills: 0 | Status: SHIPPED | OUTPATIENT
Start: 2023-12-19

## 2023-12-19 RX ORDER — EMPAGLIFLOZIN 25 MG/1
25 TABLET, FILM COATED ORAL DAILY
Qty: 90 TABLET | Refills: 1 | Status: SHIPPED | OUTPATIENT
Start: 2023-12-19

## 2023-12-19 RX ORDER — FLASH GLUCOSE SENSOR
KIT MISCELLANEOUS
Qty: 2 EACH | Refills: 11 | Status: SHIPPED | OUTPATIENT
Start: 2023-12-19

## 2023-12-19 NOTE — PATIENT INSTRUCTIONS
Perform labs fasting 8 hours with water or black coffee or or black tea diet  soda only prior to exam.    -Encourage healthy diet of whole food and avoid processed food and sugary drinks and sodas. Diet should include lean meats and vegetables including 5-7 servings of fruit and vegetables total in 1 day. Never skip breakfast.  -Encouraged exercise 30 minutes or more 5 times weekly to prevent obesity and chronic disease and eliminate stress and its effect on the body. Total 150mins weekly or more. -encouraged to continue not smoking  - recommend condom use per CDC recommendation for all  or unmarried couples  -  immunizations- annual influenza vaccine recommended  -Vitamin D3 1000- 2000 units daily recommended  -Needs 1000mg of calcium daily for osteoporosis prevention discussed. Need to ingest 1000mg of calcium daily to prevent osteoporosis later in life. I.e. one 8 ounce glass of silk [place] milk has 450 mg of calcium and label states 45%. Labels list calcium percentages not milligrams. To calculate milligrams per serving remove the percentage and add a zero (0).  I.e.  9% calcium equals 90 mg

## 2024-03-05 DIAGNOSIS — E78.2 MIXED HYPERLIPIDEMIA: ICD-10-CM

## 2024-03-05 DIAGNOSIS — E11.9 TYPE 2 DIABETES MELLITUS WITHOUT COMPLICATION, WITHOUT LONG-TERM CURRENT USE OF INSULIN (HCC): ICD-10-CM

## 2024-03-06 RX ORDER — METFORMIN HYDROCHLORIDE 500 MG/1
1000 TABLET, EXTENDED RELEASE ORAL 2 TIMES DAILY WITH MEALS
Qty: 360 TABLET | Refills: 1 | Status: SHIPPED | OUTPATIENT
Start: 2024-03-06

## 2024-03-06 RX ORDER — ATORVASTATIN CALCIUM 40 MG/1
40 TABLET, FILM COATED ORAL NIGHTLY
Qty: 90 TABLET | Refills: 0 | Status: SHIPPED | OUTPATIENT
Start: 2024-03-06

## 2024-03-06 NOTE — TELEPHONE ENCOUNTER
Refill Passed Protocol:     Pt requesting refill of   Requested Prescriptions     Pending Prescriptions Disp Refills    metFORMIN  MG Oral Tablet 24 Hr 360 tablet 1     Sig: Take 2 tablets (1,000 mg total) by mouth 2 (two) times daily with meals.    atorvastatin 40 MG Oral Tab 90 tablet 0     Sig: Take 1 tablet (40 mg total) by mouth nightly.     Refill was approved and sent to pharmacy:     Last Time Medication was Filled:    Atorvastatin 12/19/2023  Metformin 11/27/2023    Last Office Visit with Provider: 12/9/2023    Appt. scheduled on 3/21/2024

## 2024-03-15 DIAGNOSIS — E78.2 MIXED HYPERLIPIDEMIA: ICD-10-CM

## 2024-03-15 RX ORDER — ATORVASTATIN CALCIUM 40 MG/1
40 TABLET, FILM COATED ORAL NIGHTLY
Qty: 90 TABLET | Refills: 0 | OUTPATIENT
Start: 2024-03-15

## 2024-03-18 DIAGNOSIS — E11.9 TYPE 2 DIABETES MELLITUS WITHOUT COMPLICATION, WITHOUT LONG-TERM CURRENT USE OF INSULIN (HCC): ICD-10-CM

## 2024-03-18 RX ORDER — GLYBURIDE 2.5 MG/1
2.5 TABLET ORAL
Qty: 90 TABLET | Refills: 0 | OUTPATIENT
Start: 2024-03-18

## 2024-03-20 LAB
ABSOLUTE BASOPHILS: 32 CELLS/UL (ref 0–200)
ABSOLUTE EOSINOPHILS: 90 CELLS/UL (ref 15–500)
ABSOLUTE LYMPHOCYTES: 2509 CELLS/UL (ref 850–3900)
ABSOLUTE MONOCYTES: 461 CELLS/UL (ref 200–950)
ABSOLUTE NEUTROPHILS: 3309 CELLS/UL (ref 1500–7800)
ALBUMIN/GLOBULIN RATIO: 1.8 (CALC) (ref 1–2.5)
ALBUMIN: 4.7 G/DL (ref 3.6–5.1)
ALKALINE PHOSPHATASE: 69 U/L (ref 36–130)
ALT: 52 U/L (ref 9–46)
AST: 26 U/L (ref 10–40)
BASOPHILS: 0.5 %
BILIRUBIN, TOTAL: 1.4 MG/DL (ref 0.2–1.2)
BUN: 15 MG/DL (ref 7–25)
CALCIUM: 9.6 MG/DL (ref 8.6–10.3)
CARBON DIOXIDE: 28 MMOL/L (ref 20–32)
CHLORIDE: 103 MMOL/L (ref 98–110)
CHOL/HDLC RATIO: 3 (CALC)
CHOLESTEROL, TOTAL: 109 MG/DL
CREATININE, RANDOM URINE: 101 MG/DL (ref 20–320)
CREATININE: 0.9 MG/DL (ref 0.6–1.26)
EGFR: 113 ML/MIN/1.73M2
EOSINOPHILS: 1.4 %
GLOBULIN: 2.6 G/DL (CALC) (ref 1.9–3.7)
GLUCOSE: 118 MG/DL (ref 65–99)
HDL CHOLESTEROL: 36 MG/DL
HEMATOCRIT: 48.6 % (ref 38.5–50)
HEMOGLOBIN A1C: 6.7 % OF TOTAL HGB
HEMOGLOBIN: 16.1 G/DL (ref 13.2–17.1)
LDL-CHOLESTEROL: 52 MG/DL (CALC)
LYMPHOCYTES: 39.2 %
MCH: 29.7 PG (ref 27–33)
MCHC: 33.1 G/DL (ref 32–36)
MCV: 89.7 FL (ref 80–100)
MICROALBUMIN/CREATININE RATIO, RANDOM URINE: 4 MG/G CREAT
MICROALBUMIN: 0.4 MG/DL
MONOCYTES: 7.2 %
MPV: 9.6 FL (ref 7.5–12.5)
NEUTROPHILS: 51.7 %
NON-HDL CHOLESTEROL: 73 MG/DL (CALC)
PLATELET COUNT: 220 THOUSAND/UL (ref 140–400)
POTASSIUM: 4.6 MMOL/L (ref 3.5–5.3)
PROTEIN, TOTAL: 7.3 G/DL (ref 6.1–8.1)
RDW: 14 % (ref 11–15)
RED BLOOD CELL COUNT: 5.42 MILLION/UL (ref 4.2–5.8)
SODIUM: 140 MMOL/L (ref 135–146)
TRIGLYCERIDES: 125 MG/DL
TSH W/REFLEX TO FT4: 2.94 MIU/L (ref 0.4–4.5)
WHITE BLOOD CELL COUNT: 6.4 THOUSAND/UL (ref 3.8–10.8)

## 2024-03-26 ENCOUNTER — OFFICE VISIT (OUTPATIENT)
Dept: FAMILY MEDICINE CLINIC | Facility: CLINIC | Age: 39
End: 2024-03-26
Payer: COMMERCIAL

## 2024-03-26 VITALS
BODY MASS INDEX: 26 KG/M2 | WEIGHT: 162.81 LBS | TEMPERATURE: 97 F | HEART RATE: 74 BPM | RESPIRATION RATE: 16 BRPM | OXYGEN SATURATION: 96 % | SYSTOLIC BLOOD PRESSURE: 102 MMHG | DIASTOLIC BLOOD PRESSURE: 60 MMHG

## 2024-03-26 DIAGNOSIS — E78.2 MIXED HYPERLIPIDEMIA: ICD-10-CM

## 2024-03-26 DIAGNOSIS — R74.01 ELEVATED ALT MEASUREMENT: ICD-10-CM

## 2024-03-26 DIAGNOSIS — E11.9 TYPE 2 DIABETES MELLITUS WITHOUT COMPLICATION, WITHOUT LONG-TERM CURRENT USE OF INSULIN (HCC): Primary | ICD-10-CM

## 2024-03-26 DIAGNOSIS — Z23 NEED FOR VACCINATION: ICD-10-CM

## 2024-03-26 PROBLEM — R05.8 POST-VIRAL COUGH SYNDROME: Status: RESOLVED | Noted: 2022-05-27 | Resolved: 2024-03-26

## 2024-03-26 PROCEDURE — 99214 OFFICE O/P EST MOD 30 MIN: CPT | Performed by: FAMILY MEDICINE

## 2024-03-26 RX ORDER — ATORVASTATIN CALCIUM 40 MG/1
40 TABLET, FILM COATED ORAL NIGHTLY
Qty: 90 TABLET | Refills: 1 | Status: SHIPPED | OUTPATIENT
Start: 2024-03-26

## 2024-03-26 NOTE — PROGRESS NOTES
Chief Complaint   Patient presents with    Follow - Up     Type 2 diabetes mellitus without complication, without long-term current use of insulin (HCC)     DM, HL, HTN f/u.    HPI:   Alfredo Granados is a 38 year old male who presents for recheck of his diabetes, HTN, lipids. Patient’s FBS have been .  Tolerating Metformin ER 500mg 4 tabs daily Denies medications side effects. Takes Jardiance 2 x a week if eats more carbohydrate.   Last HgbA1c was   HEMOGLOBIN A1c (% of total Hgb)   Date Value   03/19/2024 6.7 (H)   11/21/2023 6.8 (H)   04/15/2023 7.1 (H)   exercise 30 min daily walking and bought new cycle  Last visit with ophthalmologist was 3/23/2024 Dr.Yun almanzar DBR  Last microalbumin was normal done 3/19/2024 normal.   Pt has been checking his feet on a regular basis. Pt denies any tingling of the feet.   Pt denies any issues with depression.   Walking increased 4x day- 30- 40 mins. Watching carbs, watching rice, lost weight-5 pounds.    Alfredo Granados is a 38 year old male who presents for recheck of hyperlipidemia on atorvastatin 20mg. Patient reports taking medications as instructed, no medication side effects noted. Denies any generalized muscle aches.  Denies headache, dizziness, chest pain, shortness of breath, dyspnea on exertion, leg swelling    Complains of right knee strain medial superior knee pain- exercises- runs, walks daily knee is not locking or giving out.  Denies any hip pain.  Pain comes and goes.  No knee swelling.  No prior injuries.  Not taking any medication.  Just noticed recently will have tenderness there.  Denies any loss of range of motion.      Wt Readings from Last 6 Encounters:   03/26/24 162 lb 12.8 oz (73.8 kg)   12/19/23 167 lb 9.6 oz (76 kg)   04/20/23 163 lb 6.4 oz (74.1 kg)   12/09/22 162 lb (73.5 kg)   05/27/22 158 lb (71.7 kg)   01/04/22 160 lb (72.6 kg)     HEMOGLOBIN A1c (% of total Hgb)   Date Value   03/19/2024 6.7 (H)   11/21/2023 6.8 (H)   04/15/2023  7.1 (H)     CHOLESTEROL, TOTAL (mg/dL)   Date Value   03/19/2024 109   11/21/2023 150   04/15/2023 112     HDL CHOLESTEROL (mg/dL)   Date Value   03/19/2024 36 (L)   11/21/2023 37 (L)   04/15/2023 38 (L)     LDL-CHOLESTEROL (mg/dL (calc))   Date Value   03/19/2024 52   11/21/2023 80   04/15/2023 54     AST (U/L)   Date Value   03/19/2024 26   11/21/2023 23   04/15/2023 17     ALT (U/L)   Date Value   03/19/2024 52 (H)   11/21/2023 48 (H)   04/15/2023 41        Current Outpatient Medications   Medication Sig Dispense Refill    metFORMIN  MG Oral Tablet 24 Hr Take 2 tablets (1,000 mg total) by mouth 2 (two) times daily with meals. 360 tablet 1    atorvastatin 40 MG Oral Tab Take 1 tablet (40 mg total) by mouth nightly. 90 tablet 0    Empagliflozin (JARDIANCE) 25 MG Oral Tab Take 25 mg by mouth daily. 90 tablet 1    Continuous Blood Gluc  (FREESTYLE ABNER 2 READER) Does not apply Device 1 Units daily. 1 each 0    Continuous Blood Gluc Sensor (FREESTYLE ABNER 14 DAY SENSOR) Does not apply Misc Change sensor q 2 weeks. Will pay cash 2 each 11    diclofenac 1 % External Gel Apply 2 g topically 4 (four) times daily. Name brand : [employer].  [employer] price  Buy otc 150 g 0    ONETOUCH ULTRA In Vitro Strip 1 each by Other route 2 (two) times daily. Diagnosis code E 11.9 200 each 3    Lancets (ONETOUCH DELICA PLUS [id number]) Does not apply Misc 1 lancet by Finger stick route 2 (two) times daily. Diagnosis code E 11.9 200 each 3    Blood Glucose Monitoring Suppl (ONETOUCH ULTRA 2) w/Device Does not apply Kit 1 lancet by Finger stick route 2 (two) times daily.        Past Medical History:   Diagnosis Date    Diabetes (HCC)       History reviewed. No pertinent surgical history.   Social History: Smoking:  Denies  Exercise:  6-7 times per week.  Diet: watches sugar closely and low carb diet     Family History   Problem Relation Age of Onset    Diabetes Father     Diabetes Mother     Asthma Mother      No Known Problems Son     No Known Problems Maternal Grandmother     Diabetes Maternal Grandfather     Heart Disorder Paternal Grandmother     Asthma Paternal Grandfather      No Known Allergies    All PFSH have been reviewed and agree.  EXAM:   /60 (BP Location: Left arm, Patient Position: Sitting, Cuff Size: adult)   Pulse 74   Temp 97.3 °F (36.3 °C) (Temporal)   Resp 16   Wt 162 lb 12.8 oz (73.8 kg)   SpO2 96%   BMI 26.36 kg/m²   Vital Signs: As above.   General: WD/WN in no acute distress.    HEENT: is unremarkable, PERRLA and EOMI. No carotid bruits. No thyromegaly or masses.   Heart: Regular rate and rhythm. S1, S2 no murmurs.   Lungs: Clear to auscultation bilaterally, with no wheeze, rhonchi, or rales.    Abdomen: soft, NT/ND no HSM and NABS.   Extremities: symmetric no abnormalities and normal strength.  No cyanosis, clubbing or edema.  Bilateral barefoot skin diabetic exam is normal, visualized feet and the appearance is normal.  Bilateral monofilament/sensation of both feet is normal.  Pulsation pedal pulse exam of both lower legs/feet is normal as well.  Vascular: TP and DP 2+ zane.  Skin: is unremarkable with no rashes. Right proximal forearm dorsal side to 3 mm adjacent hemangiomas flat erythematous smooth vascular lesions, hello  Neuro: no focal abnormalities, and reflexes coordination and gait normal and symmetric.  Psych- depression screening negative.        No visits with results within 1 Month(s) from this visit.   Latest known visit with results is:   Office Visit on 12/19/2023   Component Date Value    WHITE BLOOD CELL COUNT 03/19/2024 6.4     RED BLOOD CELL COUNT 03/19/2024 5.42     HEMOGLOBIN 03/19/2024 16.1     HEMATOCRIT 03/19/2024 48.6     MCV 03/19/2024 89.7     MCH 03/19/2024 29.7     MCHC 03/19/2024 33.1     RDW 03/19/2024 14.0     PLATELET COUNT 03/19/2024 220     MPV 03/19/2024 9.6     ABSOLUTE NEUTROPHILS 03/19/2024 3,309     ABSOLUTE LYMPHOCYTES 03/19/2024 2,509      ABSOLUTE MONOCYTES 03/19/2024 461     ABSOLUTE EOSINOPHILS 03/19/2024 90     ABSOLUTE BASOPHILS 03/19/2024 32     NEUTROPHILS 03/19/2024 51.7     LYMPHOCYTES 03/19/2024 39.2     MONOCYTES 03/19/2024 7.2     EOSINOPHILS 03/19/2024 1.4     BASOPHILS 03/19/2024 0.5     GLUCOSE 03/19/2024 118 (H)     UREA NITROGEN (BUN) 03/19/2024 15     CREATININE 03/19/2024 0.90     EGFR 03/19/2024 113     BUN/CREATININE RATIO 03/19/2024 SEE NOTE:     SODIUM 03/19/2024 140     POTASSIUM 03/19/2024 4.6     CHLORIDE 03/19/2024 103     CARBON DIOXIDE 03/19/2024 28     CALCIUM 03/19/2024 9.6     PROTEIN, TOTAL 03/19/2024 7.3     ALBUMIN 03/19/2024 4.7     GLOBULIN 03/19/2024 2.6     ALBUMIN/GLOBULIN RATIO 03/19/2024 1.8     BILIRUBIN, TOTAL 03/19/2024 1.4 (H)     ALKALINE PHOSPHATASE 03/19/2024 69     AST 03/19/2024 26     ALT 03/19/2024 52 (H)     CHOLESTEROL, TOTAL 03/19/2024 109     HDL CHOLESTEROL 03/19/2024 36 (L)     TRIGLYCERIDES 03/19/2024 125     LDL-CHOLESTEROL 03/19/2024 52     CHOL/HDLC RATIO 03/19/2024 3.0     NON-HDL CHOLESTEROL 03/19/2024 73     TSH W/REFLEX TO FT4 03/19/2024 2.94     HEMOGLOBIN A1c 03/19/2024 6.7 (H)     CREATININE, RANDOM URINE 03/19/2024 101     MICROALBUMIN 03/19/2024 0.4     MICROALBUMIN/CREATININE * 03/19/2024 4          ASSESSMENT AND PLAN:     1. Type 2 diabetes mellitus without complication, without long-term current use of insulin (HCC)  - CMP in 3 months; Future  - Lipid in 3 months; Future  - Hemoglobin A1C in 3 months; Future  - CMP in 3 months  - Lipid in 3 months  - Hemoglobin A1C in 3 months  Controlled-A1c less than 7.0  Continue twice daily Accu-Cheks  Goal of fasting blood sugar is  and postprandials are less than 180-optimal is 160 or less  Continue walking 30-60 minutes 5 days a week--not going to the gym due to COVID-19  Continue low-carb diet  Encouraged 5 pound weight loss  Eye exam completed recommended annually-performed 3/23/2024 with Dr. Rodriguez- [facility]- no  DBR  Immunizations: MMR and HPV  Importance of daily self foot exam discussed-any sores or lesions needs office visit  Recheck labs in 3 months    2. Mixed hyperlipidemia  - CMP in 3 months; Future  - Lipid in 3 months; Future  - CMP in 3 months  - Lipid in 3 months  -  continue atorvastatin 40 MG Oral Tab; Take 1 tablet (40 mg total) by mouth nightly.  Dispense: 90 tablet; Refill: 1   controlled  mediterranean diet  Exercise start brisk walk 30 mins at least 5-7 days weekly  Recheck labs in 3 months      3. Elevated ALT measurement  - CMP in 3 months; Future  - CMP in 3 months  Due to higher atorvastatin    4. Need for vaccination  - HPV (Gardasil 9)  - MMR [39968]      Meds This Visit:  Requested Prescriptions     Signed Prescriptions Disp Refills    atorvastatin 40 MG Oral Tab 90 tablet 1     Sig: Take 1 tablet (40 mg total) by mouth nightly.       Health Maintenance:  Diabetes Care Dilated Eye Exam due on 05/25/1986  Pneumococcal Vaccine: Birth to 64yrs(1 of 1 - PPSV23) due on 05/25/1992  Diabetes Care A1C due on 03/03/2021    Patient/Caregiver Education: Patient/Caregiver Education: There are no barriers to learning. Medical education done.   Outcome: Patient verbalizes understanding. Patient is notified to call with any questions, complications, allergies, or worsening or changing symptoms.  Patient is to call with any side effects or complications from the treatments as a result of today.       Imaging & Referrals:  HPV HUMAN PAPILLOMA VIRUS VACC 9 EDUARDO 3 DOSE IM     2/5/2021  Chery Landry DO      Recommendations are: continue medications as advised, check HgbA1C, check fasting lipids and CMP in 3 months, diabetic diet and exercise 7 times per week -30 minutes walking daily, check feet daily recommended to the patient.    The patient indicates understanding of these issues and agrees to the plan.  No follow-ups on file.

## 2024-03-26 NOTE — PATIENT INSTRUCTIONS
Perform labs fasting 8 hours with water or black coffee or or black tea diet  soda only prior to exam.    -Encourage healthy diet of whole food and avoid processed food and sugary drinks and sodas.  Diet should include lean meats and vegetables including 5-7 servings of fruit and vegetables total in 1 day.  Never skip breakfast.  -Encouraged exercise 30 minutes or more 5 times weekly to prevent obesity and chronic disease and eliminate stress and its effect on the body. Total 150mins weekly or more.  -encouraged to continue not smoking  - recommend condom use per CDC recommendation for all  or unmarried couples  -  immunizations- annual influenza vaccine recommended  -Vitamin D3 1000- 2000 units daily recommended  -Needs 1000mg of calcium daily for osteoporosis prevention discussed. Need to ingest 1000mg of calcium daily to prevent osteoporosis later in life.  I.e. one 8 ounce glass of silk Aguila milk has 450 mg of calcium and label states 45%.  Labels list calcium percentages not milligrams.  To calculate milligrams per serving remove the percentage and add a zero (0).  I.e. 9% calcium equals 90 mg

## 2024-04-27 DIAGNOSIS — E11.9 TYPE 2 DIABETES MELLITUS WITHOUT COMPLICATION, WITHOUT LONG-TERM CURRENT USE OF INSULIN (HCC): ICD-10-CM

## 2024-04-29 RX ORDER — METFORMIN HYDROCHLORIDE 500 MG/1
1000 TABLET, EXTENDED RELEASE ORAL 2 TIMES DAILY WITH MEALS
Qty: 360 TABLET | Refills: 1 | OUTPATIENT
Start: 2024-04-29

## 2024-07-12 DIAGNOSIS — E11.9 TYPE 2 DIABETES MELLITUS WITHOUT COMPLICATION, WITHOUT LONG-TERM CURRENT USE OF INSULIN (HCC): ICD-10-CM

## 2024-07-12 DIAGNOSIS — E78.2 MIXED HYPERLIPIDEMIA: ICD-10-CM

## 2024-07-12 RX ORDER — EMPAGLIFLOZIN 25 MG/1
25 TABLET, FILM COATED ORAL DAILY
Qty: 90 TABLET | Refills: 0 | Status: SHIPPED | OUTPATIENT
Start: 2024-07-12

## 2024-07-12 RX ORDER — ATORVASTATIN CALCIUM 40 MG/1
40 TABLET, FILM COATED ORAL NIGHTLY
Qty: 90 TABLET | Refills: 0 | Status: SHIPPED | OUTPATIENT
Start: 2024-07-12

## 2024-07-12 NOTE — TELEPHONE ENCOUNTER
Medication and dose:  Atrovastatin 40mg and Metformin 500 jardiance    30 or 90 day supply:  90 days     Pharmacy: Met[facility] jardiance and Atrovastatin [place]    Additional notes:      Your medication request will be sent to our clinical team.  If they have any questions they will call you. Patient expressed understanding.

## 2024-07-12 NOTE — TELEPHONE ENCOUNTER
Refill Passed Protocol:     Pt requesting refill of   Requested Prescriptions     Pending Prescriptions Disp Refills    Empagliflozin (JARDIANCE) 25 MG Oral Tab 90 tablet 1     Sig: Take 25 mg by mouth daily.         Refill was approved and sent to pharmacy:     Last Time Medication was Filled:  12/19/2023    Last Office Visit with Provider: 03/26/2024    Appt. scheduled on 07/23/2024

## 2024-07-21 LAB
ALBUMIN/GLOBULIN RATIO: 1.7 (CALC) (ref 1–2.5)
ALBUMIN: 4.4 G/DL (ref 3.6–5.1)
ALKALINE PHOSPHATASE: 92 U/L (ref 36–130)
ALT: 42 U/L (ref 9–46)
AST: 20 U/L (ref 10–40)
BILIRUBIN, TOTAL: 0.7 MG/DL (ref 0.2–1.2)
BUN: 12 MG/DL (ref 7–25)
CALCIUM: 9 MG/DL (ref 8.6–10.3)
CARBON DIOXIDE: 23 MMOL/L (ref 20–32)
CHLORIDE: 107 MMOL/L (ref 98–110)
CHOL/HDLC RATIO: 2.8 (CALC)
CHOLESTEROL, TOTAL: 101 MG/DL
CREATININE: 0.95 MG/DL (ref 0.6–1.26)
EGFR: 105 ML/MIN/1.73M2
GLOBULIN: 2.6 G/DL (CALC) (ref 1.9–3.7)
GLUCOSE: 129 MG/DL (ref 65–99)
HDL CHOLESTEROL: 36 MG/DL
HEMOGLOBIN A1C: 7.3 % OF TOTAL HGB
LDL-CHOLESTEROL: 40 MG/DL (CALC)
NON-HDL CHOLESTEROL: 65 MG/DL (CALC)
POTASSIUM: 4.7 MMOL/L (ref 3.5–5.3)
PROTEIN, TOTAL: 7 G/DL (ref 6.1–8.1)
SODIUM: 142 MMOL/L (ref 135–146)
TRIGLYCERIDES: 176 MG/DL

## 2024-07-23 ENCOUNTER — OFFICE VISIT (OUTPATIENT)
Dept: FAMILY MEDICINE CLINIC | Facility: CLINIC | Age: 39
End: 2024-07-23
Payer: COMMERCIAL

## 2024-07-23 VITALS
HEART RATE: 73 BPM | TEMPERATURE: 98 F | BODY MASS INDEX: 25 KG/M2 | WEIGHT: 151.81 LBS | DIASTOLIC BLOOD PRESSURE: 72 MMHG | SYSTOLIC BLOOD PRESSURE: 124 MMHG | RESPIRATION RATE: 18 BRPM | OXYGEN SATURATION: 98 %

## 2024-07-23 DIAGNOSIS — Z87.828 S/P ARTHROSCOPIC PARTIAL LATERAL MENISCECTOMY OF RIGHT KNEE: ICD-10-CM

## 2024-07-23 DIAGNOSIS — Z98.890 S/P ARTHROSCOPIC PARTIAL LATERAL MENISCECTOMY OF RIGHT KNEE: ICD-10-CM

## 2024-07-23 DIAGNOSIS — E78.2 MIXED HYPERLIPIDEMIA: ICD-10-CM

## 2024-07-23 DIAGNOSIS — E11.9 TYPE 2 DIABETES MELLITUS WITHOUT COMPLICATION, WITHOUT LONG-TERM CURRENT USE OF INSULIN (HCC): Primary | ICD-10-CM

## 2024-07-23 DIAGNOSIS — L29.0 ANAL ITCHING: ICD-10-CM

## 2024-07-23 DIAGNOSIS — Z23 NEED FOR VACCINATION: ICD-10-CM

## 2024-07-23 PROCEDURE — 90651 9VHPV VACCINE 2/3 DOSE IM: CPT | Performed by: FAMILY MEDICINE

## 2024-07-23 PROCEDURE — 90472 IMMUNIZATION ADMIN EACH ADD: CPT | Performed by: FAMILY MEDICINE

## 2024-07-23 PROCEDURE — 90471 IMMUNIZATION ADMIN: CPT | Performed by: FAMILY MEDICINE

## 2024-07-23 PROCEDURE — 90707 MMR VACCINE SC: CPT | Performed by: FAMILY MEDICINE

## 2024-07-23 PROCEDURE — 99214 OFFICE O/P EST MOD 30 MIN: CPT | Performed by: FAMILY MEDICINE

## 2024-07-23 RX ORDER — METFORMIN HYDROCHLORIDE 500 MG/1
1000 TABLET, EXTENDED RELEASE ORAL 2 TIMES DAILY WITH MEALS
Qty: 360 TABLET | Refills: 1 | Status: SHIPPED | OUTPATIENT
Start: 2024-07-23

## 2024-07-23 RX ORDER — EMPAGLIFLOZIN 25 MG/1
25 TABLET, FILM COATED ORAL DAILY
Qty: 90 TABLET | Refills: 0 | Status: SHIPPED | OUTPATIENT
Start: 2024-07-23 | End: 2024-07-23

## 2024-07-23 RX ORDER — KETOCONAZOLE 20 MG/G
1 CREAM TOPICAL 2 TIMES DAILY
Qty: 60 G | Refills: 0 | Status: SHIPPED | OUTPATIENT
Start: 2024-07-23

## 2024-07-23 RX ORDER — ATORVASTATIN CALCIUM 40 MG/1
40 TABLET, FILM COATED ORAL NIGHTLY
Qty: 90 TABLET | Refills: 0 | Status: SHIPPED | OUTPATIENT
Start: 2024-07-23

## 2024-07-23 RX ORDER — EMPAGLIFLOZIN 25 MG/1
25 TABLET, FILM COATED ORAL DAILY
Qty: 90 TABLET | Refills: 0 | Status: SHIPPED | OUTPATIENT
Start: 2024-07-23

## 2024-07-23 RX ORDER — METFORMIN HYDROCHLORIDE 500 MG/1
1000 TABLET, EXTENDED RELEASE ORAL 2 TIMES DAILY WITH MEALS
Qty: 360 TABLET | Refills: 1 | Status: SHIPPED | OUTPATIENT
Start: 2024-07-23 | End: 2024-07-23

## 2024-07-23 RX ORDER — KETOCONAZOLE 20 MG/G
1 CREAM TOPICAL 2 TIMES DAILY
Qty: 60 G | Refills: 0 | Status: SHIPPED | OUTPATIENT
Start: 2024-07-23 | End: 2024-07-23

## 2024-07-23 RX ORDER — ATORVASTATIN CALCIUM 40 MG/1
40 TABLET, FILM COATED ORAL NIGHTLY
Qty: 90 TABLET | Refills: 0 | Status: SHIPPED | OUTPATIENT
Start: 2024-07-23 | End: 2024-07-23

## 2024-07-23 NOTE — PROGRESS NOTES
Chief Complaint   Patient presents with    Follow - Up     3 months DM,discuss labs results      DM, HL, HTN f/u.    HPI:   Alfredo Granados is a 39 year old male who presents for recheck of his diabetes, HTN, lipids. Patient’s FBS have been 130.  Tolerating Metformin ER 500mg 4 tabs daily Denies medications side effects.   Status post right knee arthroscopy in [place] on June 12, 2024.  Patient states he has suffered with a postop infection and his blood sugars were up in the 300s and was on insulin while he was hospitalized on IV antibiotics.  He accounts this for the cause of his uncontrolled diabetes.  He is now back on metformin ER 4 tabs daily and started taking Jardiance daily since returning home.  He is denying any right knee pain currently or swelling.  Denies any fever or chills but did have fever when he was hospitalized postoperatively.  Denies any loss of range of motion.    Previously taking  Jardiance 2 x a week if eats more carbohydrate.    Exercise:-Will resume walking 4 times weekly 30 to 40 minutes   Diet: Watching carbs and rice consumption.  He has lost 9 pounds since last visit.  Last HgbA1c was   HEMOGLOBIN A1c (% of total Hgb)   Date Value   07/20/2024 7.3 (H)   03/19/2024 6.7 (H)   11/21/2023 6.8 (H)   exercise 30 min daily walking and bought new cycle  Last visit with ophthalmologist was 3/23/2024  no DBR  Last microalbumin was normal done 3/19/2024 normal.   Pt has been checking his feet on a regular basis. Pt denies any tingling of the feet.   Pt denies any issues with depression.     Alfredo Granados is a 39 year old male who presents for recheck of hyperlipidemia on atorvastatin 20mg. Patient reports taking medications as instructed, no medication side effects noted. Denies any generalized muscle aches.  Denies headache, dizziness, chest pain, shortness of breath, dyspnea on exertion, leg swelling    Complains of rectal itching on the outside x 3 weeks.  No precipitating  incident.  He is denying hemorrhoids or rectal bleeding.  No change in stool.  Patient is using over-the-counter external hydrocortisone which helps bring relief but then symptoms returned.  Has not tried sitz bath's.  Believes started after prolonged sitting with knee recovery.  Was hospitalized end of June in [place] for knee infection.      Wt Readings from Last 6 Encounters:   07/23/24 151 lb 12.8 oz (68.9 kg)   03/26/24 162 lb 12.8 oz (73.8 kg)   12/19/23 167 lb 9.6 oz (76 kg)   04/20/23 163 lb 6.4 oz (74.1 kg)   12/09/22 162 lb (73.5 kg)   05/27/22 158 lb (71.7 kg)     HEMOGLOBIN A1c (% of total Hgb)   Date Value   07/20/2024 7.3 (H)   03/19/2024 6.7 (H)   11/21/2023 6.8 (H)     CHOLESTEROL, TOTAL (mg/dL)   Date Value   07/20/2024 101   03/19/2024 109   11/21/2023 150     HDL CHOLESTEROL (mg/dL)   Date Value   07/20/2024 36 (L)   03/19/2024 36 (L)   11/21/2023 37 (L)     LDL-CHOLESTEROL (mg/dL (calc))   Date Value   07/20/2024 40   03/19/2024 52   11/21/2023 80     AST (U/L)   Date Value   07/20/2024 20   03/19/2024 26   11/21/2023 23     ALT (U/L)   Date Value   07/20/2024 42   03/19/2024 52 (H)   11/21/2023 48 (H)        Current Outpatient Medications   Medication Sig Dispense Refill    Empagliflozin (JARDIANCE) 25 MG Oral Tab Take 25 mg by mouth daily. 90 tablet 0    atorvastatin 40 MG Oral Tab Take 1 tablet (40 mg total) by mouth nightly. 90 tablet 0    metFORMIN  MG Oral Tablet 24 Hr Take 2 tablets (1,000 mg total) by mouth 2 (two) times daily with meals. 360 tablet 1    Continuous Blood Gluc  (FREESTYLE ABNER 2 READER) Does not apply Device 1 Units daily. 1 each 0    Continuous Blood Gluc Sensor (FREESTYLE ABNER 14 DAY SENSOR) Does not apply Misc Change sensor q 2 weeks. Will pay cash 2 each 11    diclofenac 1 % External Gel Apply 2 g topically 4 (four) times daily. Name brand : Voltaren.  [employer] and  [employer]  [employer] price  Buy otc 150 g 0    ONETOUCH ULTRA In Vitro Strip 1 each by Other route 2  (two) times daily. Diagnosis code E 11.9 200 each 3    Lancets (ONETOUCH DELICA PLUS [id number]) Does not apply Misc 1 lancet by Finger stick route 2 (two) times daily. Diagnosis code E 11.9 200 each 3    Blood Glucose Monitoring Suppl (ONETOUCH ULTRA 2) w/Device Does not apply Kit 1 lancet by Finger stick route 2 (two) times daily.        Past Medical History:    Diabetes (HCC)      Past Surgical History:   Procedure Laterality Date    Knee surgery      Meniscectomy Right 06/12/2024    partial      Social History: Smoking:  Denies  Exercise:  6-7 times per week.  Diet: watches sugar closely and low carb diet     Family History   Problem Relation Age of Onset    Diabetes Father     Diabetes Mother     Asthma Mother     No Known Problems Son     No Known Problems Maternal Grandmother     Diabetes Maternal Grandfather     Heart Disorder Paternal Grandmother     Asthma Paternal Grandfather      No Known Allergies    All PFSH have been reviewed and agree.  EXAM:   /72 (BP Location: Left arm, Patient Position: Sitting, Cuff Size: adult)   Pulse 73   Temp 98.1 °F (36.7 °C) (Temporal)   Resp 18   Wt 151 lb 12.8 oz (68.9 kg)   SpO2 98%   BMI 24.58 kg/m²   Vital Signs: As above.   General: WD/WN in no acute distress.    HEENT: is unremarkable, PERRLA and EOMI. No carotid bruits. No thyromegaly or masses.   Heart: Regular rate and rhythm. S1, S2 no murmurs.   Lungs: Clear to auscultation bilaterally, with no wheeze, rhonchi, or rales.    Abdomen: soft, NT/ND no HSM and NABS.   Extremities: symmetric no abnormalities and normal strength.  No cyanosis, clubbing or edema.  Vascular: TP and DP 2+ zane.  Skin: is unremarkable with no rashes. Right proximal forearm dorsal side to 3 mm adjacent hemangiomas flat erythematous smooth vascular lesions, hello  Neuro: no focal abnormalities, and reflexes coordination and gait normal and symmetric.  Psych- depression screening negative.  : Declined rectal exam-denies  rash      No visits with results within 1 Month(s) from this visit.   Latest known visit with results is:   Office Visit on 12/19/2023   Component Date Value    WHITE BLOOD CELL COUNT 03/19/2024 6.4     RED BLOOD CELL COUNT 03/19/2024 5.42     HEMOGLOBIN 03/19/2024 16.1     HEMATOCRIT 03/19/2024 48.6     MCV 03/19/2024 89.7     MCH 03/19/2024 29.7     MCHC 03/19/2024 33.1     RDW 03/19/2024 14.0     PLATELET COUNT 03/19/2024 220     MPV 03/19/2024 9.6     ABSOLUTE NEUTROPHILS 03/19/2024 3,309     ABSOLUTE LYMPHOCYTES 03/19/2024 2,509     ABSOLUTE MONOCYTES 03/19/2024 461     ABSOLUTE EOSINOPHILS 03/19/2024 90     ABSOLUTE BASOPHILS 03/19/2024 32     NEUTROPHILS 03/19/2024 51.7     LYMPHOCYTES 03/19/2024 39.2     MONOCYTES 03/19/2024 7.2     EOSINOPHILS 03/19/2024 1.4     BASOPHILS 03/19/2024 0.5     GLUCOSE 03/19/2024 118 (H)     UREA NITROGEN (BUN) 03/19/2024 15     CREATININE 03/19/2024 0.90     EGFR 03/19/2024 113     BUN/CREATININE RATIO 03/19/2024 SEE NOTE:     SODIUM 03/19/2024 140     POTASSIUM 03/19/2024 4.6     CHLORIDE 03/19/2024 103     CARBON DIOXIDE 03/19/2024 28     CALCIUM 03/19/2024 9.6     PROTEIN, TOTAL 03/19/2024 7.3     ALBUMIN 03/19/2024 4.7     GLOBULIN 03/19/2024 2.6     ALBUMIN/GLOBULIN RATIO 03/19/2024 1.8     BILIRUBIN, TOTAL 03/19/2024 1.4 (H)     ALKALINE PHOSPHATASE 03/19/2024 69     AST 03/19/2024 26     ALT 03/19/2024 52 (H)     CHOLESTEROL, TOTAL 03/19/2024 109     HDL CHOLESTEROL 03/19/2024 36 (L)     TRIGLYCERIDES 03/19/2024 125     LDL-CHOLESTEROL 03/19/2024 52     CHOL/HDLC RATIO 03/19/2024 3.0     NON-HDL CHOLESTEROL 03/19/2024 73     TSH W/REFLEX TO FT4 03/19/2024 2.94     HEMOGLOBIN A1c 03/19/2024 6.7 (H)     CREATININE, RANDOM URINE 03/19/2024 101     MICROALBUMIN 03/19/2024 0.4     MICROALBUMIN/CREATININE * 03/19/2024 4      No visits with results within 1 Month(s) from this visit.   Latest known visit with results is:   Office Visit on 03/26/2024   Component Date Value     GLUCOSE 07/20/2024 129 (H)     UREA NITROGEN (BUN) 07/20/2024 12     CREATININE 07/20/2024 0.95     EGFR 07/20/2024 105     BUN/CREATININE RATIO 07/20/2024 SEE NOTE:     SODIUM 07/20/2024 142     POTASSIUM 07/20/2024 4.7     CHLORIDE 07/20/2024 107     CARBON DIOXIDE 07/20/2024 23     CALCIUM 07/20/2024 9.0     PROTEIN, TOTAL 07/20/2024 7.0     ALBUMIN 07/20/2024 4.4     GLOBULIN 07/20/2024 2.6     ALBUMIN/GLOBULIN RATIO 07/20/2024 1.7     BILIRUBIN, TOTAL 07/20/2024 0.7     ALKALINE PHOSPHATASE 07/20/2024 92     AST 07/20/2024 20     ALT 07/20/2024 42     CHOLESTEROL, TOTAL 07/20/2024 101     HDL CHOLESTEROL 07/20/2024 36 (L)     TRIGLYCERIDES 07/20/2024 176 (H)     LDL-CHOLESTEROL 07/20/2024 40     CHOL/HDLC RATIO 07/20/2024 2.8     NON-HDL CHOLESTEROL 07/20/2024 65     HEMOGLOBIN A1c 07/20/2024 7.3 (H)            ASSESSMENT AND PLAN:     1. Type 2 diabetes mellitus without complication, without long-term current use of insulin (HCC)  - Lipid Panel; Future  - Hemoglobin A1C; Future  - Comp Metabolic Panel (14); Future  - Empagliflozin (JARDIANCE) 25 MG Oral Tab; Take 25 mg by mouth daily.  Dispense: 90 tablet; Refill: 0  - metFORMIN  MG Oral Tablet 24 Hr; Take 2 tablets (1,000 mg total) by mouth 2 (two) times daily with meals.  Dispense: 360 tablet; Refill: 1  - Lipid Panel  - Hemoglobin A1C  - Comp Metabolic Panel (14)  Uncontrolled due to recent right knee postop infection after arthroscopy-was hospitalized and had blood sugars in the 300s was temporarily on insulin in [place].  Resume metformin ER total 2000 mg daily  Encouraged to take Jardiance 25 mg daily  Lost 9 pounds since last visit  Continue twice daily Accu-Cheks  Goal of fasting blood sugar is  and postprandials are less than 180-optimal is 160 or less  Continue walking 30-60 minutes 4 days a week-  Continue low-carb diet  Eye exam completed recommended annually-[facility]  Immunizations: MMR and  HPV  Importance of daily self foot exam discussed-any sores or lesions needs office visit  Recheck labs in 3 months    2. Mixed hyperlipidemia  - Lipid Panel; Future  - Comp Metabolic Panel (14); Future  - atorvastatin 40 MG Oral Tab; Take 1 tablet (40 mg total) by mouth nightly.  Dispense: 90 tablet; Refill: 0  - Lipid Panel  - Comp Metabolic Panel (14)  Controlled  LDL at goal less than 70, TG is mildly elevated  Encouraged low-carb Mediterranean diet  Walk or exercise 30 to 60 minutes 4 to 5 days a week  LFT normalized  Recheck labs in 3 months    3. S/P arthroscopic partial lateral meniscectomy of right knee  Healed-no pain or fevers.    4. Need for vaccination  - HPV (Gardasil 9) [52564] #3  -MMR today    5. Anal itching  - ketoconazole 2 % External Cream; Apply 1 Application topically 2 (two) times daily. For 10 days  Dispense: 60 g; Refill: 0  Sitz bath's daily t for 20 minutes horoughly dry area  Declined rectal exam-denies rash  No rectal bleeding  If symptoms not resolved in 2 weeks and return to office      Meds This Visit:  Requested Prescriptions     Signed Prescriptions Disp Refills    atorvastatin 40 MG Oral Tab 90 tablet 0     Sig: Take 1 tablet (40 mg total) by mouth nightly.    Empagliflozin (JARDIANCE) 25 MG Oral Tab 90 tablet 0     Sig: Take 25 mg by mouth daily.    metFORMIN  MG Oral Tablet 24 Hr 360 tablet 1     Sig: Take 2 tablets (1,000 mg total) by mouth 2 (two) times daily with meals.    ketoconazole 2 % External Cream 60 g 0     Sig: Apply 1 Application topically 2 (two) times daily. For 10 days       Health Maintenance:  Diabetes Care Dilated Eye Exam due on 05/25/1986  Pneumococcal Vaccine: Birth to 64yrs(1 of 1 - PPSV23) due on 05/25/1992  Diabetes Care A1C due on 03/03/2021    Patient/Caregiver Education: Patient/Caregiver Education: There are no barriers to learning. Medical education done.   Outcome: Patient verbalizes understanding. Patient is notified to call with any  questions, complications, allergies, or worsening or changing symptoms.  Patient is to call with any side effects or complications from the treatments as a result of today.       Imaging & Referrals:  None     2/5/2021  Chery Landry DO      Recommendations are: continue medications as advised, check HgbA1C, check fasting lipids and CMP in 3 months, diabetic diet and exercise 7 times per week -30 minutes walking daily, check feet daily recommended to the patient.    The patient indicates understanding of these issues and agrees to the plan.  Return in about 3 months (around 10/23/2024) for HTN,HL,DM, discuss labs, sooner if needed..

## 2024-10-19 LAB
ALBUMIN/GLOBULIN RATIO: 2 (CALC) (ref 1–2.5)
ALBUMIN: 5 G/DL (ref 3.6–5.1)
ALKALINE PHOSPHATASE: 81 U/L (ref 36–130)
ALT: 44 U/L (ref 9–46)
AST: 22 U/L (ref 10–40)
BILIRUBIN, TOTAL: 0.8 MG/DL (ref 0.2–1.2)
BUN: 19 MG/DL (ref 7–25)
CALCIUM: 9.7 MG/DL (ref 8.6–10.3)
CARBON DIOXIDE: 28 MMOL/L (ref 20–32)
CHLORIDE: 103 MMOL/L (ref 98–110)
CHOL/HDLC RATIO: 2.7 (CALC)
CHOLESTEROL, TOTAL: 103 MG/DL
CREATININE: 0.89 MG/DL (ref 0.6–1.26)
EGFR: 112 ML/MIN/1.73M2
GLOBULIN: 2.5 G/DL (CALC) (ref 1.9–3.7)
GLUCOSE: 136 MG/DL (ref 65–99)
HDL CHOLESTEROL: 38 MG/DL
HEMOGLOBIN A1C: 7.5 % OF TOTAL HGB
LDL-CHOLESTEROL: 46 MG/DL (CALC)
NON-HDL CHOLESTEROL: 65 MG/DL (CALC)
POTASSIUM: 4.8 MMOL/L (ref 3.5–5.3)
PROTEIN, TOTAL: 7.5 G/DL (ref 6.1–8.1)
SODIUM: 141 MMOL/L (ref 135–146)
TRIGLYCERIDES: 105 MG/DL

## 2024-10-22 ENCOUNTER — OFFICE VISIT (OUTPATIENT)
Dept: FAMILY MEDICINE CLINIC | Facility: CLINIC | Age: 39
End: 2024-10-22
Payer: COMMERCIAL

## 2024-10-22 VITALS
TEMPERATURE: 98 F | SYSTOLIC BLOOD PRESSURE: 122 MMHG | HEIGHT: 65 IN | OXYGEN SATURATION: 97 % | RESPIRATION RATE: 19 BRPM | DIASTOLIC BLOOD PRESSURE: 70 MMHG | BODY MASS INDEX: 26.39 KG/M2 | HEART RATE: 81 BPM | WEIGHT: 158.38 LBS

## 2024-10-22 DIAGNOSIS — E11.9 TYPE 2 DIABETES MELLITUS WITHOUT COMPLICATION, WITHOUT LONG-TERM CURRENT USE OF INSULIN (HCC): Primary | ICD-10-CM

## 2024-10-22 DIAGNOSIS — Z23 NEED FOR VACCINATION: ICD-10-CM

## 2024-10-22 DIAGNOSIS — E78.2 MIXED HYPERLIPIDEMIA: ICD-10-CM

## 2024-10-22 PROBLEM — R74.01 ELEVATED ALT MEASUREMENT: Status: RESOLVED | Noted: 2022-05-27 | Resolved: 2024-10-22

## 2024-10-22 PROCEDURE — 90656 IIV3 VACC NO PRSV 0.5 ML IM: CPT | Performed by: FAMILY MEDICINE

## 2024-10-22 PROCEDURE — 99214 OFFICE O/P EST MOD 30 MIN: CPT | Performed by: FAMILY MEDICINE

## 2024-10-22 PROCEDURE — 90471 IMMUNIZATION ADMIN: CPT | Performed by: FAMILY MEDICINE

## 2024-10-22 RX ORDER — ATORVASTATIN CALCIUM 40 MG/1
40 TABLET, FILM COATED ORAL NIGHTLY
Qty: 90 TABLET | Refills: 0 | Status: SHIPPED | OUTPATIENT
Start: 2024-10-22

## 2024-10-22 RX ORDER — METFORMIN HYDROCHLORIDE 500 MG/1
1000 TABLET, EXTENDED RELEASE ORAL 2 TIMES DAILY WITH MEALS
Qty: 360 TABLET | Refills: 1 | Status: SHIPPED | OUTPATIENT
Start: 2024-10-22

## 2024-10-22 RX ORDER — BLOOD SUGAR DIAGNOSTIC
1 STRIP MISCELLANEOUS 2 TIMES DAILY
Qty: 200 EACH | Refills: 3 | Status: SHIPPED | OUTPATIENT
Start: 2024-10-22

## 2024-10-22 NOTE — PROGRESS NOTES
Chief Complaint   Patient presents with    Follow - Up     3 months DM, discuss labs results      DM, HL, HTN f/u.    HPI:   Alfredo Granados is a 39 year old male who presents for recheck of his diabetes, HTN, lipids. Patient’s FBS have been 130.  Tolerating Metformin ER 500mg 4 tabs daily Denies medications side effects.   Status post right knee arthroscopy in [place] on June 12, 2024. Has been on exercise restrictions and  accounts this for the cause of his uncontrolled diabetes.  Admits eating more sitting at home attributes to increased A1c.     Medications: metformin ER 4 tabs daily and Jardiance  denies medication side effects  Previously taking  Jardiance 2 x a week if eats more carbohydrate.    Exercise:- no exercise due to knee restructions. Will resume walking 4 times weekly 30 to 40 minutes when cleared by orthopedist.  Diet: diet higher carb since surgery     Weight: gained 7# since last visit.  Last HgbA1c was   HEMOGLOBIN A1c (% of total Hgb)   Date Value   10/18/2024 7.5 (H)   07/20/2024 7.3 (H)   03/19/2024 6.7 (H)   Last visit with ophthalmologist was 3/23/2024  no DBR  Last microalbumin was normal done 3/19/2024 normal.   Pt has been checking his feet on a regular basis. Pt denies any tingling of the feet.   Pt denies any issues with depression.     Alfredo Granados is a 39 year old male who presents for recheck of hyperlipidemia on atorvastatin 20mg. Patient reports taking medications as instructed, no medication side effects noted. Denies any generalized muscle aches.  Denies headache, dizziness, chest pain, shortness of breath, dyspnea on exertion, leg swelling      Wt Readings from Last 6 Encounters:   10/22/24 158 lb 6.4 oz (71.8 kg)   07/23/24 151 lb 12.8 oz (68.9 kg)   03/26/24 162 lb 12.8 oz (73.8 kg)   12/19/23 167 lb 9.6 oz (76 kg)   04/20/23 163 lb 6.4 oz (74.1 kg)   12/09/22 162 lb (73.5 kg)     HEMOGLOBIN A1c (% of total Hgb)   Date Value   10/18/2024 7.5 (H)   07/20/2024  7.3 (H)   03/19/2024 6.7 (H)     CHOLESTEROL, TOTAL (mg/dL)   Date Value   10/18/2024 103   07/20/2024 101   03/19/2024 109     HDL CHOLESTEROL (mg/dL)   Date Value   10/18/2024 38 (L)   07/20/2024 36 (L)   03/19/2024 36 (L)     LDL-CHOLESTEROL (mg/dL (calc))   Date Value   10/18/2024 46   07/20/2024 40   03/19/2024 52     AST (U/L)   Date Value   10/18/2024 22   07/20/2024 20   03/19/2024 26     ALT (U/L)   Date Value   10/18/2024 44   07/20/2024 42   03/19/2024 52 (H)        Current Outpatient Medications   Medication Sig Dispense Refill    empagliflozin (JARDIANCE) 25 MG Oral Tab Take 1 tablet (25 mg total) by mouth daily. 90 tablet 0    metFORMIN  MG Oral Tablet 24 Hr Take 2 tablets (1,000 mg total) by mouth 2 (two) times daily with meals. 360 tablet 1    atorvastatin 40 MG Oral Tab Take 1 tablet (40 mg total) by mouth nightly. 90 tablet 0    ONETOUCH ULTRA In Vitro Strip 1 each by Other route 2 (two) times daily. Diagnosis code E 11.9 200 each 3    Continuous Blood Gluc  (FREESTYLE ABNER 2 READER) Does not apply Device 1 Units daily. 1 each 0    Continuous Blood Gluc Sensor (FREESTYLE ABNER 14 DAY SENSOR) Does not apply Misc Change sensor q 2 weeks. Will pay cash 2 each 11    Lancets (ONETOUCH DELICA PLUS [id number]) Does not apply Misc 1 lancet by Finger stick route 2 (two) times daily. Diagnosis code E 11.9 200 each 3    Blood Glucose Monitoring Suppl (ONETOUCH ULTRA 2) w/Device Does not apply Kit 1 lancet by Finger stick route 2 (two) times daily.        Past Medical History:    Diabetes (HCC)      Past Surgical History:   Procedure Laterality Date    Meniscectomy Right 06/12/2024    partial      Social History: Smoking:  Denies  Exercise:  6-7 times per week.  Diet: watches sugar closely and low carb diet     Family History   Problem Relation Age of Onset    Diabetes Father     Diabetes Mother     Asthma Mother     No Known Problems Son     No Known Problems Maternal Grandmother     Diabetes  Maternal Grandfather     Heart Disorder Paternal Grandmother     Asthma Paternal Grandfather      No Known Allergies    All PFSH have been reviewed and agree.  EXAM:   /70 (BP Location: Right arm, Patient Position: Sitting, Cuff Size: adult)   Pulse 81   Temp 98.1 °F (36.7 °C) (Temporal)   Resp 19   Ht 5' 5\" (1.651 m)   Wt 158 lb 6.4 oz (71.8 kg)   SpO2 97%   BMI 26.36 kg/m²   Vital Signs: As above.   General: WD/WN in no acute distress.    HEENT: is unremarkable, PERRLA and EOMI. No carotid bruits. No thyromegaly or masses.   Heart: Regular rate and rhythm. S1, S2 no murmurs.   Lungs: Clear to auscultation bilaterally, with no wheeze, rhonchi, or rales.    Abdomen: soft, NT/ND no HSM and NABS.   Extremities: symmetric no abnormalities and normal strength.  No cyanosis, clubbing or edema.  Vascular: TP and DP 2+ zane.  Skin: is unremarkable with no rashes. Right proximal forearm dorsal side to 3 mm adjacent hemangiomas flat erythematous smooth vascular lesions  Neuro: no focal abnormalities, and reflexes coordination and gait normal and symmetric.  Psych- depression screening negative.  : Declined rectal exam-denies rash    No visits with results within 1 Month(s) from this visit.   Latest known visit with results is:   Office Visit on 07/23/2024   Component Date Value    CHOLESTEROL, TOTAL 10/18/2024 103     HDL CHOLESTEROL 10/18/2024 38 (L)     TRIGLYCERIDES 10/18/2024 105     LDL-CHOLESTEROL 10/18/2024 46     CHOL/HDLC RATIO 10/18/2024 2.7     NON-HDL CHOLESTEROL 10/18/2024 65     HEMOGLOBIN A1c 10/18/2024 7.5 (H)     GLUCOSE 10/18/2024 136 (H)     UREA NITROGEN (BUN) 10/18/2024 19     CREATININE 10/18/2024 0.89     EGFR 10/18/2024 112     BUN/CREATININE RATIO 10/18/2024 SEE NOTE:     SODIUM 10/18/2024 141     POTASSIUM 10/18/2024 4.8     CHLORIDE 10/18/2024 103     CARBON DIOXIDE 10/18/2024 28     CALCIUM 10/18/2024 9.7     PROTEIN, TOTAL 10/18/2024 7.5     ALBUMIN 10/18/2024 5.0     GLOBULIN  10/18/2024 2.5     ALBUMIN/GLOBULIN RATIO 10/18/2024 2.0     BILIRUBIN, TOTAL 10/18/2024 0.8     ALKALINE PHOSPHATASE 10/18/2024 81     AST 10/18/2024 22     ALT 10/18/2024 44            ASSESSMENT AND PLAN:     1. Type 2 diabetes mellitus without complication, without long-term current use of insulin ([facility])  - Lipid Panel; Future  - Hemoglobin A1C; Future  - Comp Metabolic Panel (14); Future  - MICROALB/CREAT RATIO, RANDOM URINE [6517] [Q]; Future  - empagliflozin (JARDIANCE) 25 MG Oral Tab; Take 1 tablet (25 mg total) by mouth daily.  Dispense: 90 tablet; Refill: 0  - metFORMIN  MG Oral Tablet 24 Hr; Take 2 tablets (1,000 mg total) by mouth 2 (two) times daily with meals.  Dispense: 360 tablet; Refill: 1  - ONETOUCH ULTRA In Vitro Strip; 1 each by Other route 2 (two) times daily. Diagnosis code E 11.9  Dispense: 200 each; Refill: 3  - Lipid Panel  - Hemoglobin A1C  - Comp Metabolic Panel (14)  - MICROALB/CREAT RATIO, RANDOM URINE [6517] [Q]  Uncontrolled  A1c>7.0  Continue metformin and jardiance  Refused 3rd medication/pioglitazone  Return to LOW carb diet  Start exercising when cleared by orthopedist for knee  gained 9 pounds since last visit  Continue twice daily Accu-Cheks  Goal of fasting blood sugar is  and postprandials are less than 180-optimal is 160 or less  Continue walking 30-60 minutes 4 days a week-  Continue low-carb diet  Eye exam completed recommended annually-performed 3/23/2024 with Dr. Rodriguez- [facility]- no DBR  Immunizations: Flu and needs covid 19 booster  Importance of daily self foot exam discussed-any sores or lesions needs office visit  Recheck labs in 3 months    2. Mixed hyperlipidemia  - Lipid Panel; Future  - Comp Metabolic Panel (14); Future  - atorvastatin 40 MG Oral Tab; Take 1 tablet (40 mg total) by mouth nightly.  Dispense: 90 tablet; Refill: 0  - Lipid Panel  - Comp Metabolic Panel (14)  Controlled  LDL at goal less than 70, TG is mildly elevated  Encouraged  low-carb Mediterranean diet  Walk or exercise 30 to 60 minutes 4 to 5 days a week  LFT normalized  Continue atorvastatin  Recheck labs in 3 months    3. Need for vaccination  - INFLUENZA VACCINE, TRI, PRESERV FREE, 0.5 ML        Meds This Visit:  Requested Prescriptions     Signed Prescriptions Disp Refills    empagliflozin (JARDIANCE) 25 MG Oral Tab 90 tablet 0     Sig: Take 1 tablet (25 mg total) by mouth daily.    metFORMIN  MG Oral Tablet 24 Hr 360 tablet 1     Sig: Take 2 tablets (1,000 mg total) by mouth 2 (two) times daily with meals.    atorvastatin 40 MG Oral Tab 90 tablet 0     Sig: Take 1 tablet (40 mg total) by mouth nightly.    ONETOUCH ULTRA In Vitro Strip 200 each 3     Si each by Other route 2 (two) times daily. Diagnosis code E 11.9       Health Maintenance:  Diabetes Care Dilated Eye Exam due on 1986  Pneumococcal Vaccine: Birth to 64yrs(1 of  - PPSV23) due on 1992  Diabetes Care A1C due on 2021    Patient/Caregiver Education: Patient/Caregiver Education: There are no barriers to learning. Medical education done.   Outcome: Patient verbalizes understanding. Patient is notified to call with any questions, complications, allergies, or worsening or changing symptoms.  Patient is to call with any side effects or complications from the treatments as a result of today.       Imaging & Referrals:  INFLUENZA VACCINE, TRI, PRESERV FREE, 0.5 ML     2021  Chery Landry DO      Recommendations are: continue medications as advised, check HgbA1C, check fasting lipids and CMP in 3 months, diabetic diet and exercise 7 times per week -30 minutes walking daily, check feet daily recommended to the patient.    The patient indicates understanding of these issues and agrees to the plan.  Return in about 6 weeks (around 12/3/2024) for annual physical- recheck diabetes- no labs prior.then will determine diabetes

## 2024-11-08 DIAGNOSIS — E11.9 TYPE 2 DIABETES MELLITUS WITHOUT COMPLICATION, WITHOUT LONG-TERM CURRENT USE OF INSULIN (HCC): ICD-10-CM

## 2024-11-09 RX ORDER — EMPAGLIFLOZIN 25 MG/1
25 TABLET, FILM COATED ORAL DAILY
Qty: 90 TABLET | Refills: 0 | Status: SHIPPED | OUTPATIENT
Start: 2024-11-09

## 2024-11-09 NOTE — TELEPHONE ENCOUNTER
Refill Passed Protocol:     Pt requesting refill of   Requested Prescriptions     Pending Prescriptions Disp Refills    JARDIANCE 25 MG Oral Tab [Pharmacy Med Name: Jardiance 25 Mg Tab [facility]] 90 tablet 0     Sig: TAKE ONE TABLET BY MOUTH ONE TIME DAILY      Last Time Medication was Filled:  10/22/2024    Last Office Visit with Provider: 10/22/2024    Appt. scheduled on 12/05/2024

## 2025-01-07 ENCOUNTER — TELEPHONE (OUTPATIENT)
Dept: FAMILY MEDICINE CLINIC | Facility: CLINIC | Age: 40
End: 2025-01-07

## 2025-01-07 NOTE — TELEPHONE ENCOUNTER
Received fax from [employer] stating to send a script from Jardiance  to [employer] for employee medication program.  Left message for Pt to call back and verify [employer]- paperwork in triage

## 2025-01-07 NOTE — TELEPHONE ENCOUNTER
Pt will do some research on [employer] and call back   I called # on paper and went to a generic voicemail and no company name

## 2025-05-06 DIAGNOSIS — E11.9 TYPE 2 DIABETES MELLITUS WITHOUT COMPLICATION, WITHOUT LONG-TERM CURRENT USE OF INSULIN (HCC): ICD-10-CM

## 2025-05-06 DIAGNOSIS — E78.2 MIXED HYPERLIPIDEMIA: ICD-10-CM

## 2025-05-07 NOTE — TELEPHONE ENCOUNTER
Refill(s) Requested:   Requested Prescriptions     Pending Prescriptions Disp Refills    ATORVASTATIN 40 MG Oral Tab [Pharmacy Med Name: ATORVASTATIN 40 MG TABLET] 90 tablet 0     Sig: TAKE 1 TABLET BY MOUTH EVERY DAY AT NIGHT     Medication Last Prescribed:  10/22/2024 90 days 0 refills     Has dose or medication been changed    since last prescription? *Review notes*    []  Yes       [x]  No     Last office visit: 10/22/2024 (in-office)  Visit date not found (virtual visit)     Relevant lab results:   Lab Results   Component Value Date    CHOLEST 103 10/18/2024    TRIG 105 10/18/2024    HDL 38 (L) 10/18/2024    LDL 46 10/18/2024    TCHDLRATIO 2.7 10/18/2024    NONHDLC 65 10/18/2024      Patient was asked to follow-up in: Return in about 6 weeks (around 12/3/2024) for annual physical- recheck diabetes- no labs prior.     Appointment due: December 2024    Future Appointments: Visit date not found     Action taken:  [x] Patient overdue for follow up appointment. My chart message sent to call office

## 2025-05-08 RX ORDER — EMPAGLIFLOZIN 25 MG/1
25 TABLET, FILM COATED ORAL DAILY
Qty: 90 TABLET | Refills: 0 | Status: SHIPPED | OUTPATIENT
Start: 2025-05-08

## 2025-05-08 RX ORDER — ATORVASTATIN CALCIUM 40 MG/1
40 TABLET, FILM COATED ORAL NIGHTLY
Qty: 30 TABLET | Refills: 0 | Status: SHIPPED | OUTPATIENT
Start: 2025-05-08

## 2025-05-08 NOTE — TELEPHONE ENCOUNTER
Refill(s) Requested:   Requested Prescriptions     Pending Prescriptions Disp Refills    JARDIANCE 25 MG Oral Tab [Pharmacy Med Name: Jardiance 25 Mg Tab [facility]] 90 tablet 0     Sig: TAKE ONE TABLET BY MOUTH ONE TIME DAILY     Medication Last Prescribed: 11/09/2024 90 days 0 refills     Has dose or medication been changed    since last prescription? *Review notes*    []  Yes       [x]  No     Last office visit: 10/22/2024 (in-office)  Visit date not found (virtual visit)     Relevant details from LOV note:   Uncontrolled  A1c>7.0  Continue metformin and jardiance  Refused 3rd medication/pioglitazone  Return to LOW carb diet  Start exercising when cleared by orthopedist for knee  gained 9 pounds since last visit  Continue twice daily Accu-Cheks  Goal of fasting blood sugar is  and postprandials are less than 180-optimal is 160 or less  Continue walking 30-60 minutes 4 days a week-  Continue low-carb diet  Eye exam completed recommended annually-performed 3/23/2024 with Dr. Rodriguez- [facility]- no DBR  Immunizations: Flu and needs covid 19 booster  Importance of daily self foot exam discussed-any sores or lesions needs office visit  Recheck labs in 3 months     Relevant lab results:   Lab Results   Component Value Date    A1C 7.5 (H) 10/18/2024      Patient was asked to follow-up in:   Return in about 6 weeks (around 12/3/2024) for annual physical- recheck diabetes- no labs prior.     Future Appointments: 5/20/2025 Chery Landry DO     Action taken:  [x] Routed to provider for review - patient has upcoming appointment

## 2025-05-17 LAB
ALBUMIN/GLOBULIN RATIO: 2 (CALC) (ref 1–2.5)
ALBUMIN: 4.8 G/DL (ref 3.6–5.1)
ALKALINE PHOSPHATASE: 72 U/L (ref 36–130)
ALT: 57 U/L (ref 9–46)
AST: 26 U/L (ref 10–40)
BILIRUBIN, TOTAL: 1.1 MG/DL (ref 0.2–1.2)
BUN: 19 MG/DL (ref 7–25)
CALCIUM: 9.4 MG/DL (ref 8.6–10.3)
CARBON DIOXIDE: 25 MMOL/L (ref 20–32)
CHLORIDE: 104 MMOL/L (ref 98–110)
CHOL/HDLC RATIO: 2.8 (CALC)
CHOLESTEROL, TOTAL: 111 MG/DL
CREATININE, RANDOM URINE: 111 MG/DL (ref 20–320)
CREATININE: 1.01 MG/DL (ref 0.6–1.26)
EGFR: 98 ML/MIN/1.73M2
GLOBULIN: 2.4 G/DL (CALC) (ref 1.9–3.7)
GLUCOSE: 106 MG/DL (ref 65–99)
HDL CHOLESTEROL: 40 MG/DL
HEMOGLOBIN A1C: 6.8 %
LDL-CHOLESTEROL: 49 MG/DL (CALC)
MICROALBUMIN/CREATININE RATIO, RANDOM URINE: 2 MG/G CREAT
MICROALBUMIN: 0.2 MG/DL
NON-HDL CHOLESTEROL: 71 MG/DL (CALC)
POTASSIUM: 4.5 MMOL/L (ref 3.5–5.3)
PROTEIN, TOTAL: 7.2 G/DL (ref 6.1–8.1)
SODIUM: 140 MMOL/L (ref 135–146)
TRIGLYCERIDES: 136 MG/DL

## 2025-05-20 ENCOUNTER — PATIENT MESSAGE (OUTPATIENT)
Dept: FAMILY MEDICINE CLINIC | Facility: CLINIC | Age: 40
End: 2025-05-20

## 2025-05-20 ENCOUNTER — OFFICE VISIT (OUTPATIENT)
Dept: FAMILY MEDICINE CLINIC | Facility: CLINIC | Age: 40
End: 2025-05-20
Payer: COMMERCIAL

## 2025-05-20 VITALS
BODY MASS INDEX: 25.81 KG/M2 | HEART RATE: 67 BPM | TEMPERATURE: 97 F | WEIGHT: 160.63 LBS | RESPIRATION RATE: 16 BRPM | OXYGEN SATURATION: 98 % | DIASTOLIC BLOOD PRESSURE: 78 MMHG | SYSTOLIC BLOOD PRESSURE: 126 MMHG | HEIGHT: 66 IN

## 2025-05-20 DIAGNOSIS — Z00.00 ANNUAL PHYSICAL EXAM: Primary | ICD-10-CM

## 2025-05-20 DIAGNOSIS — Z13.0 SCREENING FOR ENDOCRINE, NUTRITIONAL, METABOLIC AND IMMUNITY DISORDER: ICD-10-CM

## 2025-05-20 DIAGNOSIS — Z13.29 SCREENING FOR ENDOCRINE, NUTRITIONAL, METABOLIC AND IMMUNITY DISORDER: ICD-10-CM

## 2025-05-20 DIAGNOSIS — E78.2 MIXED HYPERLIPIDEMIA: ICD-10-CM

## 2025-05-20 DIAGNOSIS — Z13.21 SCREENING FOR ENDOCRINE, NUTRITIONAL, METABOLIC AND IMMUNITY DISORDER: ICD-10-CM

## 2025-05-20 DIAGNOSIS — Z13.228 SCREENING FOR ENDOCRINE, NUTRITIONAL, METABOLIC AND IMMUNITY DISORDER: ICD-10-CM

## 2025-05-20 DIAGNOSIS — E11.9 TYPE 2 DIABETES MELLITUS WITHOUT COMPLICATION, WITHOUT LONG-TERM CURRENT USE OF INSULIN (HCC): ICD-10-CM

## 2025-05-20 PROCEDURE — 99214 OFFICE O/P EST MOD 30 MIN: CPT | Performed by: FAMILY MEDICINE

## 2025-05-20 PROCEDURE — 99395 PREV VISIT EST AGE 18-39: CPT | Performed by: FAMILY MEDICINE

## 2025-05-20 RX ORDER — ATORVASTATIN CALCIUM 40 MG/1
40 TABLET, FILM COATED ORAL NIGHTLY
Qty: 90 TABLET | Refills: 1 | Status: SHIPPED | OUTPATIENT
Start: 2025-05-20

## 2025-05-20 RX ORDER — METFORMIN HYDROCHLORIDE 500 MG/1
1000 TABLET, EXTENDED RELEASE ORAL 2 TIMES DAILY WITH MEALS
Qty: 360 TABLET | Refills: 1 | Status: SHIPPED | OUTPATIENT
Start: 2025-05-20

## 2025-05-20 RX ORDER — LANCETS 33 GAUGE
1 EACH MISCELLANEOUS 2 TIMES DAILY
Qty: 200 EACH | Refills: 3 | Status: SHIPPED | OUTPATIENT
Start: 2025-05-20

## 2025-05-20 RX ORDER — BLOOD SUGAR DIAGNOSTIC
1 STRIP MISCELLANEOUS 2 TIMES DAILY
Qty: 200 EACH | Refills: 3 | Status: SHIPPED | OUTPATIENT
Start: 2025-05-20

## 2025-05-20 NOTE — PROGRESS NOTES
Chief Complaint   Patient presents with    Physical     Annual exam        REASON FOR VISIT:    Alfredo Granados is a 39 year old male who presents for an Annual Health Assessment and discuss labs completed 5/16/2025 for diabetes and hyperlipidemia.      Type 2 DM- Patient’s FBS   2hrPost prandial not checking.    Medications:Metformin ER 500mg 4 tabs daily and Jardiance 25mg daily. Denies other medication side effects.   Diet: Trying to follow low-carb     Exercise: Walking only 30 to 60 minutes 5 days a week no running due to right knee issues  Last HgbA1c was   HEMOGLOBIN A1c   Date Value   05/16/2025 6.8 % (H)   10/18/2024 7.5 % of total Hgb (H)   07/20/2024 7.3 % of total Hgb (H)   Last visit with ophthalmologist was 3/29/2025 Dr.Robert Lu OD no DBR  Last microalbumin was normal done 5/16/2025 normal.   Pt has been checking his feet on a regular basis. Pt denies any tingling of the feet.   Pt denies any issues with depression.   Walking increased 4x day- 30- 40 mins. Watching carbs, watching rice, lost weight-5 pounds.     Alfredo Granados is a 36 year old male who presents for recheck of hyperlipidemia on atorvastatin 20mg. Patient reports taking medications as instructed, no medication side effects noted. Denies any generalized muscle aches.  Wanted to decrease medication because he is concerned the dose is too high despite the fact he has no medication.  Feels he is taking \"too much medication\".     , monogamous  On vit D daily- yes   MVI- yes  Calcium- eats dairy  Colonoscopy- no  PSA: None denies nocturia or family history of prostate cancer  Exercise - walks 30 minutes daily and gym 1 hour 4x week, runs 1 mile and weight lifting  Diet- lower carb  Dentist regularly- no  Annual eye exam-wears glasses, yes completed due 3/2024-[employer] or Dr. Mejia  Etoh: 0-3 drinks per month- social occasions  Cigs: former smoker quit 2019- 1 cig daily x 7 years  Immunizations: Needs HPV #3 in 3  months, COVID-19 booster  FH significant: reviewed  Family History   Problem Relation Age of Onset    Diabetes Father     Diabetes Mother     Asthma Mother     Stroke Mother     No Known Problems Son     No Known Problems Maternal Grandmother     Diabetes Maternal Grandfather     Heart Disorder Paternal Grandmother     Asthma Paternal Grandfather      Wt Readings from Last 6 Encounters:   05/20/25 160 lb 9.6 oz (72.8 kg)   10/22/24 158 lb 6.4 oz (71.8 kg)   07/23/24 151 lb 12.8 oz (68.9 kg)   03/26/24 162 lb 12.8 oz (73.8 kg)   12/19/23 167 lb 9.6 oz (76 kg)   04/20/23 163 lb 6.4 oz (74.1 kg)       Patient Active Problem List   Diagnosis    Type 2 diabetes mellitus without complication, without long-term current use of insulin ([facility])    Mixed hyperlipidemia    Hemangioma of skin    BMI 25.0-25.9,adult    Chronic pain of right knee    Sprain of lateral collateral ligament of right knee    S/P arthroscopic partial lateral meniscectomy of right knee    Anal itching     Current Outpatient Medications   Medication Sig Dispense Refill    atorvastatin 40 MG Oral Tab TAKE 1 TABLET BY MOUTH EVERY DAY AT NIGHT 30 tablet 0    JARDIANCE 25 MG Oral Tab TAKE ONE TABLET BY MOUTH ONE TIME DAILY 90 tablet 0    metFORMIN  MG Oral Tablet 24 Hr Take 2 tablets (1,000 mg total) by mouth 2 (two) times daily with meals. 360 tablet 1    ONETOUCH ULTRA In Vitro Strip 1 each by Other route 2 (two) times daily. Diagnosis code E 11.9 200 each 3    Continuous Blood Gluc  (FREESTYLE ABNER 2 READER) Does not apply Device 1 Units daily. 1 each 0    Continuous Blood Gluc Sensor (FREESTYLE ABNER 14 DAY SENSOR) Does not apply Misc Change sensor q 2 weeks. Will pay cash 2 each 11    Lancets (ONETOUCH DELICA PLUS [id number]) Does not apply Misc 1 lancet by Finger stick route 2 (two) times daily. Diagnosis code E 11.9 200 each 3    Blood Glucose Monitoring Suppl (ONETOUCH ULTRA 2) w/Device Does not apply Kit 1 lancet by Finger stick route 2  (two) times daily.       Wt Readings from Last 6 Encounters:   05/20/25 160 lb 9.6 oz (72.8 kg)   10/22/24 158 lb 6.4 oz (71.8 kg)   07/23/24 151 lb 12.8 oz (68.9 kg)   03/26/24 162 lb 12.8 oz (73.8 kg)   12/19/23 167 lb 9.6 oz (76 kg)   04/20/23 163 lb 6.4 oz (74.1 kg)     Body mass index is 25.92 kg/m².    No results found for: \"GLUCOSE\"  Lab Results   Component Value Date    CHOLEST 111 05/16/2025    CHOLEST 103 10/18/2024    CHOLEST 101 07/20/2024     Lab Results   Component Value Date    HDL 40 05/16/2025    HDL 38 (L) 10/18/2024    HDL 36 (L) 07/20/2024     No results found for: \"TRIGLY\"  Lab Results   Component Value Date    LDL 49 05/16/2025    LDL 46 10/18/2024    LDL 40 07/20/2024     Lab Results   Component Value Date    AST 26 05/16/2025    AST 22 10/18/2024    AST 20 07/20/2024     Lab Results   Component Value Date    ALT 57 (H) 05/16/2025    ALT 44 10/18/2024    ALT 42 07/20/2024     No results found for: \"TSH\"  Lab Results   Component Value Date    BUN 19 05/16/2025    BUN 19 10/18/2024    BUN 12 07/20/2024    CREATSERUM 1.01 05/16/2025    CREATSERUM 0.89 10/18/2024    CREATSERUM 0.95 07/20/2024     No results found for: \"PSA\"    General Health     How would you describe your current health state?: Fair    Type of Diet: Balanced    How do you maintain positive mental well-being?: Social Interaction, Visiting Friends, Visiting Family    How would you describe your daily physical activity?: Moderate    If you are a male age 45-79 or a female age 55-79, do you take aspirin?: No    Have you had any immunizations at another office such as Influenza, Hepatitis B, Tetanus, or Pneumococcal?: No    At any time do you feel concerned for the safety/well-being of yourself and/or your children, in your home or elsewhere?: No     CAGE:     Cut: Have you ever felt you should Cut down on your drinking?: No    Annoyed: Have people Annoyed you by criticizing your drinking?: No    Guilty: Have you ever felt bad or  Guilty about your drinking?: No    Eye Opener: Have you ever had a drink first thing in the morning to steady your nerves or to get rid of a hangover (Eye opener)?: No    Scoring  Total Score: 0     Depression Screening (PHQ-2/PHQ-9): Over the LAST 2 WEEKS   Little interest or pleasure in doing things (over the last two weeks)?: Not at all    Feeling down, depressed, or hopeless (over the last two weeks)?: Not at all    PHQ-2 SCORE: 0        PREVENTATIVE SERVICES  INDICATIONS AND SCHEDULE Recommendation Internal Lab or Procedure External Lab or Procedure   Colonoscopy Screen Every 10 years No recommendations at this time    Flex Sigmoidoscopy Screen  Every 5 years No results found for this or any previous visit.    Fecal Occult Blood  Annually No results found for: \"FOB\", \"OCCULTSTOOL\"    Obesity Screening Screen all adults annually Body mass index is 25.92 kg/m².      Preventive Services for Which Recommendations Vary with Risk Recommendation Internal Lab or Procedure External Lab or Procedure   Cholesterol Screening Recommended screening varies with age, risk and gender LDL-CHOLESTEROL (mg/dL (calc))   Date Value   05/16/2025 49       Diabetes Screening  If history of high blood pressure or other  risk factors HEMOGLOBIN A1c (%)   Date Value   05/16/2025 6.8 (H)     GLUCOSE (mg/dL)   Date Value   05/16/2025 106 (H)         Gonorrhea Screening If high risk No results found for: \"GONOCOCCUS\"    HIV Screening For all adults age 18-65, older adults at increased risk No results found for: \"HIV\"    Syphilis Screening Screen if pregnant or high risk No results found for: \"RPR\"    Hepatitis C Screening Screen those at high risk plus screen one time for adults born 1944-1 965 Hepatitis C Virus (no units)   Date Value   05/10/2021 Nonreactive       Tuberculosis Screen If high risk No components found for: \"PPDINDURAT\"      Disease Monitoring:    SPECIFIC DISEASE MONITORING Internal Lab or Procedure External Lab or Procedure    Annual Monitoring of Persistent     Medications (ACE/ARB, digoxin, diuretics)    Potassium  Annually POTASSIUM (mmol/L)   Date Value   05/16/2025 4.5         No data to display                Creatinine  Annually CREATININE (mg/dL)   Date Value   05/16/2025 1.01         No data to display                Digoxin Serum Conc  Annually No results found for: \"DIGOXIN\"      No data to display                Diabetes      HgbA1C  Annually HEMOGLOBIN A1c (%)   Date Value   05/16/2025 6.8 (H)         No data to display                Creat/alb ratio  Annually CREATININE (mg/dL)   Date Value   05/16/2025 1.01        LDL  Annually LDL-CHOLESTEROL (mg/dL (calc))   Date Value   05/16/2025 49         No data to display                 Dilated Eye exam  Annually     5/20/2025     1:44 PM   Data entered on:   Last Dilated Eye Exam 3/29/2025          No data to display                Asthma  (Annually between Nov. 1 & Dec. 31)    Date of last AAP/ACT and counseling given on importance of controller meds.                 ALLERGIES:   No Known Allergies    CURRENT MEDICATIONS:   Current Outpatient Medications   Medication Sig Dispense Refill    atorvastatin 40 MG Oral Tab TAKE 1 TABLET BY MOUTH EVERY DAY AT NIGHT 30 tablet 0    JARDIANCE 25 MG Oral Tab TAKE ONE TABLET BY MOUTH ONE TIME DAILY 90 tablet 0    metFORMIN  MG Oral Tablet 24 Hr Take 2 tablets (1,000 mg total) by mouth 2 (two) times daily with meals. 360 tablet 1    ONETOUCH ULTRA In Vitro Strip 1 each by Other route 2 (two) times daily. Diagnosis code E 11.9 200 each 3    Continuous Blood Gluc  (FREESTYLE ABNER 2 READER) Does not apply Device 1 Units daily. 1 each 0    Continuous Blood Gluc Sensor (FREESTYLE ABNER 14 DAY SENSOR) Does not apply Misc Change sensor q 2 weeks. Will pay cash 2 each 11    Lancets (ONETOUCH DELICA PLUS [id number]) Does not apply Misc 1 lancet by Finger stick route 2 (two) times daily. Diagnosis code E 11.9 200 each 3    Blood Glucose  Monitoring Suppl (ONETOUCH ULTRA 2) w/Device Does not apply Kit 1 lancet by Finger stick route 2 (two) times daily.        MEDICAL INFORMATION:   Past Medical History:    Diabetes (HCC)      Past Surgical History:   Procedure Laterality Date    Meniscectomy Right 06/12/2024    partial      Family History   Problem Relation Age of Onset    Diabetes Father     Diabetes Mother     Asthma Mother     Stroke Mother     No Known Problems Son     No Known Problems Maternal Grandmother     Diabetes Maternal Grandfather     Heart Disorder Paternal Grandmother     Asthma Paternal Grandfather       SOCIAL HISTORY:   Social History     Socioeconomic History    Marital status: Single   Tobacco Use    Smoking status: Former     Current packs/day: 0.50     Average packs/day: 0.5 packs/day for 10.0 years (5.0 ttl pk-yrs)     Types: Cigarettes    Smokeless tobacco: Never   Vaping Use    Vaping status: Never Used   Substance and Sexual Activity    Alcohol use: Yes    Drug use: Never     Social Drivers of Health     Food Insecurity: No Food Insecurity (5/20/2025)    NCSS - Food Insecurity     Worried About Running Out of Food in the Last Year: No     Ran Out of Food in the Last Year: No   Transportation Needs: No Transportation Needs (5/20/2025)    NCSS - Transportation     Lack of Transportation: No   Housing Stability: Not At Risk (5/20/2025)    NCSS - Housing/Utilities     Has Housing: Yes     Worried About Losing Housing: No     Unable to Get Utilities: No     Occ:    : yes       REVIEW OF SYSTEMS:   GENERAL: feels well otherwise  SKIN: denies any unusual skin lesions  EYES: denies blurred vision or double vision  HEENT: denies nasal congestion, sinus pain or ST  LUNGS: denies shortness of breath with exertion  CARDIOVASCULAR: denies chest pain on exertion  GI: denies abdominal pain, denies heartburn  : denies nocturia or changes in stream  MUSCULOSKELETAL: Right knee pain denies back pain  NEURO: denies headaches  PSYCHE:  denies depression or anxiety  HEMATOLOGIC: denies hx of anemia  ENDOCRINE: denies thyroid history  ALL/ASTHMA: denies hx of allergy or asthma    EXAM:   /78   Pulse 67   Temp 97.1 °F (36.2 °C) (Temporal)   Resp 16   Ht 5' 6\" (1.676 m)   Wt 160 lb 9.6 oz (72.8 kg)   SpO2 98%   BMI 25.92 kg/m²   GENERAL: well developed, well nourished, in no apparent distress  SKIN: no rashes, no suspicious lesions  HEENT: atraumatic, normocephalic, ears and throat are clear  EYES:PERRLA, EOMI, normal optic disk, conjunctiva are clear  NECK: supple, no adenopathy, no bruits  CHEST: no chest tenderness  BREAST: no dominant or suspicious mass  LUNGS: clear to auscultation  CARDIO: RRR without murmur  GI: good BS's, no masses, HSM or tenderness  : Declined  RECTAL: Declined  MUSCULOSKELETAL: back is not tender, FROM of the back  EXTREMITIES: no cyanosis, clubbing or edema.  And ROM knees bilateral.  No patellar instability on exam bilateral.  No knee effusion bilateral.  No palpable pain around the knee joint or patella on exam bilateral.  Negative anterior posterior drawer test bilateral.  No meniscal signs.  Bilateral barefoot skin diabetic exam is normal, visualized feet and the appearance is normal except for some mild dry skin on the right proximal foot near the first MTP joints.  Possible noninfected callus versus plantar wart but not painful and no palpable mass.  Denied any foreign body sensation.  Bilateral monofilament/sensation of both feet is normal.  Pulsation pedal pulse exam of both lower legs/feet is normal as well.  NEURO: Oriented times three, cranial nerves are intact, motor and sensory are grossly intact    ASSESSMENT AND OTHER RELEVANT CHRONIC CONDITIONS:   Alfredo Granados is a 39 year old male who presents for an Annual Health Assessment.       No visits with results within 1 Month(s) from this visit.   Latest known visit with results is:   Office Visit on 10/22/2024   Component Date Value     CHOLESTEROL, TOTAL 05/16/2025 111     HDL CHOLESTEROL 05/16/2025 40     TRIGLYCERIDES 05/16/2025 136     LDL-CHOLESTEROL 05/16/2025 49     CHOL/HDLC RATIO 05/16/2025 2.8     NON-HDL CHOLESTEROL 05/16/2025 71     HEMOGLOBIN A1c 05/16/2025 6.8 (H)     GLUCOSE 05/16/2025 106 (H)     UREA NITROGEN (BUN) 05/16/2025 19     CREATININE 05/16/2025 1.01     EGFR 05/16/2025 98     BUN/CREATININE RATIO 05/16/2025 SEE NOTE:     SODIUM 05/16/2025 140     POTASSIUM 05/16/2025 4.5     CHLORIDE 05/16/2025 104     CARBON DIOXIDE 05/16/2025 25     CALCIUM 05/16/2025 9.4     PROTEIN, TOTAL 05/16/2025 7.2     ALBUMIN 05/16/2025 4.8     GLOBULIN 05/16/2025 2.4     ALBUMIN/GLOBULIN RATIO 05/16/2025 2.0     BILIRUBIN, TOTAL 05/16/2025 1.1     ALKALINE PHOSPHATASE 05/16/2025 72     AST 05/16/2025 26     ALT 05/16/2025 57 (H)     CREATININE, RANDOM URINE 05/16/2025 111     MICROALBUMIN 05/16/2025 0.2     MICROALBUMIN/CREATININE * 05/16/2025 2            PLAN SUMMARY:   1. Annual physical exam  -Encourage Mediterranean diet  -Encouraged exercise 30 minutes to 60 minutes daily x 3-5x weekly for 150 minutes or more to prevent obesity and chronic disease and eliminate stress and its effect on the body.  -encouraged to continue not smoking  -safe sex practices - recommend condom use  -mammogram order placed- if age 40y or older, recommend annual  -self breast exams encouraged monthly  -immunizations-completed COVID-19, needs hep A #2 and Prevnar, recommend annual influenza shot in fall  -Vitamin D3  2000 units daily recommended  -Needs 1000 mg of calcium daily for osteoporosis prevention discussed  -thin prep pap recommended every 3 years if normal  - CBC With Differential With Platelet  - Comp Metabolic Panel (14); Future  - Lipid Panel; Future  - TSH W Reflex To Free T4  - HGB A1C [496] [Q]; Future  - Comp Metabolic Panel (14)  - Lipid Panel  - HGB A1C [496] [Q]    2. Type 2 diabetes mellitus without complication, without long-term current use  of insulin (HCC)  - Comp Metabolic Panel (14); Future  - Lipid Panel; Future  - HGB A1C [496] [Q]; Future  - empagliflozin (JARDIANCE) 25 MG Oral Tab; Take 1 tablet (25 mg total) by mouth daily.  Dispense: 90 tablet; Refill: 1  - metFORMIN  MG Oral Tablet 24 Hr; Take 2 tablets (1,000 mg total) by mouth 2 (two) times daily with meals.  Dispense: 360 tablet; Refill: 1  - Comp Metabolic Panel (14)  - Lipid Panel  - HGB A1C [496] [Q]  - ONETOUCH ULTRA In Vitro Strip; 1 each by Other route 2 (two) times daily. Diagnosis code E 11.9  Dispense: 200 each; Refill: 3  - Lancets (ONETOUCH DELICA PLUS [id number]) Does not apply Misc; 1 lancet  by Finger stick route 2 (two) times daily. Diagnosis code E 11.9  Dispense: 200 each; Refill: 3  Controlled  Continue current medications low-carb diet, regular exercise and follow-up in 3 months    3. Mixed hyperlipidemia  - Lipid Panel; Future  - atorvastatin 40 MG Oral Tab; Take 1 tablet (40 mg total) by mouth nightly.  Dispense: 90 tablet; Refill: 1  - Lipid Panel  Controlled  Continue atorvastatin at current dose since LDL less than 70 and TGs are normal and cholesterol panel has no abnormalities.  Discussed benefit of atherosclerosis prevention with current levels and lowering dose would increase cholesterol and decrease his preventative CAD benefit.    4. BMI 25.0-25.9,adult-normal    5. Screening for endocrine, nutritional, metabolic and immunity disorder  - CBC With Differential With Platelet  - TSH W Reflex To Free T4    The patient and provider have a longitudinal relationship to address/treat the serious or complex condition as stated in this encounter.    Assessment & Plan  Type 2 diabetes mellitus  Type 2 diabetes mellitus is well-controlled with an A1c of 6.8 and fasting glucose levels ranging from 88 to 121 mg/dL. Kidney function, liver function, and urine microalbumin are normal. He is not using the blogfoster system due to gym activities and is currently using OneTouch  for glucose monitoring.  - Continue Jardiance 25 mg for diabetes management.  - Renew labs in 3 months.  - Check feet regularly for signs of redness, sores, or ulcers.  - Monitor for signs of infection, especially in the feet, and contact the office if sores or ulcers develop.  - Continue using OneTouch for glucose monitoring.  Repeat labs and OV in 3 months    Hyperlipidemia  Hyperlipidemia is well-controlled with LDL at 49 mg/dL and triglycerides at 136 mg/dL. Atorvastatin 40 mg is effectively managing cholesterol levels without side effects. He expressed a desire to reduce the atorvastatin dosage, but was advised against it due to the risk of increased cholesterol levels and reduced cardiovascular protection. Emphasized the importance of maintaining current dosage for continued cardiovascular protection.  - Continue atorvastatin 40 mg for cholesterol management.  Repeat labs and OV in 3 months    Dry skin on right foot  Dry skin noted on the feet, presenting as thickened skin without pain. Differential diagnosis includes dry skin versus wart. No signs of infection or foreign body present. He was advised to monitor for changes, such as pain or circular thickening, which may indicate a wart.  - Monitor for changes in the skin, such as pain or circular thickening.  - Contact the office if changes occur that may indicate a wart.        The patient indicates understanding of these issues and agrees to the plan.  Return in about 3 months (around 8/20/2025) for HTN,HL,DM, discuss labs, sooner if needed..    Diet counseling perfomed  Exercise counseling perfomed    SUGGESTED VACCINATIONS - Influenza, Pneumococcal, Zoster, Tetanus     Immunization History   Administered Date(s) Administered    Covid-19 Vaccine Moderna 100 mcg/0.5 ml 03/16/2021, 04/13/2021, 11/12/2021    FLUZONE 6 months and older PFS 0.5 ml (71925) 11/20/2020, 09/25/2021, 09/21/2023    HEP B, Adult 02/05/2021, 05/06/2021, 01/04/2022    Hep A, Adult  02/05/2021, 05/27/2022    Hpv Virus Vaccine 9 [facility] Im 09/21/2023, 11/20/2023, 07/23/2024    Influenza 10/11/2022    Influenza Vaccine, trivalent (IIV3), PF 0.5mL ([id number]) 10/22/2024    MMR 07/23/2024    Pneumococcal (Prevnar 13) 05/27/2022    Pneumovax 23 05/06/2021    TDAP 12/10/2020       Influenza Annually   Pneumococcal if high risk   Td/Tdap once then every 10 years   HPV Males 11-21   Zoster (Shingles) 60 and older: one dose   Varicella 2 doses if not immune   MMR 1-2 doses if born after 1955 and not immune

## 2025-05-20 NOTE — PATIENT INSTRUCTIONS
Perform labs fasting 8 hours with water or black coffee or or black tea diet  soda only prior to exam.    -Encourage healthy diet of whole food and avoid processed food and sugary drinks and sodas.  Diet should include lean meats and vegetables including 5-7 servings of fruit and vegetables total in 1 day.  Never skip breakfast.  -Encouraged exercise 30 minutes to 60 minutes 5 times daily to prevent obesity and chronic disease and eliminate stress and its effect on the body. Recommend 150minutes total weekly  -encouraged to continue not smoking  - recommend condom use per CDC recommendation for all  or unmarried couples  - immunizations-recommend annual Flu shot in the fall  -over the counter Vitamin D3  2000 units daily recommended  -Needs 1000mg of calcium daily for osteoporosis prevention discussed. Need to ingest 1000mg of calcium daily to prevent osteoporosis later in life.

## (undated) NOTE — LETTER
07/08/21        Alfredo Granados  [id number] [facility]      Dear John Romano,    Our records indicate that you have outstanding lab work and or testing that was ordered for you and has not yet been completed      CMP       Lip